# Patient Record
Sex: FEMALE | Race: BLACK OR AFRICAN AMERICAN | NOT HISPANIC OR LATINO
[De-identification: names, ages, dates, MRNs, and addresses within clinical notes are randomized per-mention and may not be internally consistent; named-entity substitution may affect disease eponyms.]

---

## 2017-01-20 ENCOUNTER — RECORD ABSTRACTING (OUTPATIENT)
Age: 27
End: 2017-01-20

## 2017-02-10 ENCOUNTER — MED ADMIN CHARGE (OUTPATIENT)
Age: 27
End: 2017-02-10

## 2017-02-10 DIAGNOSIS — Z86.19 PERSONAL HISTORY OF OTHER INFECTIOUS AND PARASITIC DISEASES: ICD-10-CM

## 2017-03-13 ENCOUNTER — TRANSCRIPTION ENCOUNTER (OUTPATIENT)
Age: 27
End: 2017-03-13

## 2017-06-10 ENCOUNTER — TRANSCRIPTION ENCOUNTER (OUTPATIENT)
Age: 27
End: 2017-06-10

## 2017-11-20 ENCOUNTER — TRANSCRIPTION ENCOUNTER (OUTPATIENT)
Age: 27
End: 2017-11-20

## 2018-05-17 ENCOUNTER — TRANSCRIPTION ENCOUNTER (OUTPATIENT)
Age: 28
End: 2018-05-17

## 2018-07-30 ENCOUNTER — TRANSCRIPTION ENCOUNTER (OUTPATIENT)
Age: 28
End: 2018-07-30

## 2020-12-15 PROBLEM — Z86.19 HISTORY OF CANDIDIASIS OF VAGINA: Status: RESOLVED | Noted: 2017-02-10 | Resolved: 2020-12-15

## 2021-03-15 ENCOUNTER — APPOINTMENT (OUTPATIENT)
Dept: PLASTIC SURGERY | Facility: CLINIC | Age: 31
End: 2021-03-15
Payer: COMMERCIAL

## 2021-03-15 VITALS — HEIGHT: 59 IN | BODY MASS INDEX: 38.3 KG/M2 | WEIGHT: 190 LBS

## 2021-03-15 DIAGNOSIS — N62 HYPERTROPHY OF BREAST: ICD-10-CM

## 2021-03-15 PROCEDURE — 99203 OFFICE O/P NEW LOW 30 MIN: CPT

## 2021-03-15 PROCEDURE — 99072 ADDL SUPL MATRL&STAF TM PHE: CPT

## 2021-03-15 RX ORDER — LEVONORGESTREL 1.5 MG/1
1.5 TABLET ORAL ONCE
Qty: 1 | Refills: 0 | Status: DISCONTINUED | COMMUNITY
Start: 2017-06-14 | End: 2021-03-15

## 2021-03-15 RX ORDER — FLUCONAZOLE 150 MG/1
150 TABLET ORAL
Qty: 1 | Refills: 0 | Status: DISCONTINUED | COMMUNITY
Start: 2017-02-10 | End: 2021-03-15

## 2021-03-16 PROBLEM — N62 MACROMASTIA: Status: ACTIVE | Noted: 2021-03-16

## 2021-03-16 NOTE — PHYSICAL EXAM
[de-identified] : well-appearing, NAD [de-identified] : EOMI [de-identified] : supple [de-identified] : nonlabored breathing [de-identified] : bilateral shoulder grooving present\par no trunk deformities [de-identified] : Bilateral superior pole deflation with poor skin tone and significant striae.  narrow breast footprint height BL\par Left breast: no palpable masses, nipple retraction or discharge.\par Right breast: no palpable masses, nipple retraction or discharge.\par Minimal bilateral axillary rolls\par Bilateral macromastia (right ~ g > left) with Grade II ptosis with chronic inflammatory hyperpigmentation skin changes\par Bilateral diminished NAC sensibility\par Anticipated volume of resection of EACH breast based on CLINICAL ASSESSMENT: 500-650 g  \par Anticipated volume of resection of EACH breast based on BSA: 500 g\par \par Breast measurements (standing, cm):\par R SN-Nipple: 34\par R Nipple-IMF: 14.5\par R Nipple - midsternum: 11.5\par R NAC diameter: 9.5\par IMF position assymmetrical, left 0.5 cm higher than right breast\par L SN-Nipple: 35\par L Nipple-IMF: 14.5\par L Nipple - midsternum: 11.5\par L NAC diameter: 9 [de-identified] : soft, protuberant, extensive periumbilical skin striae [de-identified] : FROM x 4

## 2021-03-16 NOTE — HISTORY OF PRESENT ILLNESS
[FreeTextEntry1] : Pt is a 31 y/o F with no significant PMH who presents for breast reduction consultation. C/o heavy breasts associated with neck and back pain, bilateral shoulder pain and brassiere grooving, and inframammary rashing. Also c/o difficulty finding clothing/bras that fit properly and difficulty exercising. No PT/chiropractor/XRays. Uses supportive pillows. C/o occasional nipple sensitivity and mild breast pain due to heaviness. Denies nipple inversion/discharge.\par \par Denies personal or family h/o breast disease. Has routine breast exams with gynecologist.\par \par Ht 4'11" Wt 190lb- gained 15-20lbs over the past year\par Current bra size: 38DDD, unsure what size she would like to be, ?C\par Denies h/o VTE or MRSA infections\par Nonsmoker\par Lives with 10 y/o son; no plans for pregnancy in the near future\par \par occupation: , working from home currently

## 2021-03-16 NOTE — ASSESSMENT
[FreeTextEntry1] : 29 yo F obese woman with symptomatic bilateral macromastia with Grade III ptosis.\par \par -The patient is a good candidate for bilateral reduction mammoplasty with an inferior pedicle Wise pattern technique\par -I had a detailed discussion regarding the procedure and reviewed the benefits, risks, and outcomes. \par -The risks include but are not limited to bleeding, infection, seroma, hematoma, wound separation or poor wound healing, tissue ischemia/necrosis of skin flap or NAC, scarring in particular hypertrophic or keloid scarring, breast asymmetry, need for additional surgery, loss of NAC sensation, and inability to breastfeed postpartum in the future, no improvement of neck pain, and dissatisfaction with outcome.\par -I reviewed with the patient the location of incisional scars, possible use of surgical drain, need to wear surgical support bra post-operatively, and no post-operative heavy lifting.\par -The patient understands the procedure and the risks, and she elects to proceed with reduction mammoplasty. Informed consent was obtained.\par -Will schedule her for outpatient LYNSEY procedure under [facility]\par \par -Photos were taken.\par \par Due to COVID-19, pre-visit patient instructions were explained to the patient and their symptoms were checked upon arrival. Masks were used by the healthcare provider and staff and the examination room was cleaned after the patient visit concluded\par

## 2021-07-29 ENCOUNTER — APPOINTMENT (OUTPATIENT)
Dept: PLASTIC SURGERY | Facility: AMBULATORY SURGERY CENTER | Age: 31
End: 2021-07-29

## 2021-08-05 ENCOUNTER — APPOINTMENT (OUTPATIENT)
Dept: PLASTIC SURGERY | Facility: CLINIC | Age: 31
End: 2021-08-05

## 2021-10-07 ENCOUNTER — OUTPATIENT (OUTPATIENT)
Dept: OUTPATIENT SERVICES | Facility: HOSPITAL | Age: 31
LOS: 1 days | Discharge: HOME | End: 2021-10-07
Payer: COMMERCIAL

## 2021-10-07 VITALS
RESPIRATION RATE: 18 BRPM | HEART RATE: 87 BPM | DIASTOLIC BLOOD PRESSURE: 61 MMHG | TEMPERATURE: 98 F | HEIGHT: 59 IN | SYSTOLIC BLOOD PRESSURE: 112 MMHG | WEIGHT: 197.98 LBS | OXYGEN SATURATION: 100 %

## 2021-10-07 DIAGNOSIS — N62 HYPERTROPHY OF BREAST: ICD-10-CM

## 2021-10-07 DIAGNOSIS — Z01.818 ENCOUNTER FOR OTHER PREPROCEDURAL EXAMINATION: ICD-10-CM

## 2021-10-07 LAB
ALBUMIN SERPL ELPH-MCNC: 4.6 G/DL — SIGNIFICANT CHANGE UP (ref 3.5–5.2)
ALP SERPL-CCNC: 74 U/L — SIGNIFICANT CHANGE UP (ref 30–115)
ALT FLD-CCNC: 17 U/L — SIGNIFICANT CHANGE UP (ref 0–41)
ANION GAP SERPL CALC-SCNC: 13 MMOL/L — SIGNIFICANT CHANGE UP (ref 7–14)
AST SERPL-CCNC: 15 U/L — SIGNIFICANT CHANGE UP (ref 0–41)
BASOPHILS # BLD AUTO: 0.02 K/UL — SIGNIFICANT CHANGE UP (ref 0–0.2)
BASOPHILS NFR BLD AUTO: 0.2 % — SIGNIFICANT CHANGE UP (ref 0–1)
BILIRUB SERPL-MCNC: 0.5 MG/DL — SIGNIFICANT CHANGE UP (ref 0.2–1.2)
BUN SERPL-MCNC: 8 MG/DL — LOW (ref 10–20)
CALCIUM SERPL-MCNC: 9.5 MG/DL — SIGNIFICANT CHANGE UP (ref 8.5–10.1)
CHLORIDE SERPL-SCNC: 103 MMOL/L — SIGNIFICANT CHANGE UP (ref 98–110)
CO2 SERPL-SCNC: 24 MMOL/L — SIGNIFICANT CHANGE UP (ref 17–32)
CREAT SERPL-MCNC: 0.8 MG/DL — SIGNIFICANT CHANGE UP (ref 0.7–1.5)
EOSINOPHIL # BLD AUTO: 0.03 K/UL — SIGNIFICANT CHANGE UP (ref 0–0.7)
EOSINOPHIL NFR BLD AUTO: 0.3 % — SIGNIFICANT CHANGE UP (ref 0–8)
GLUCOSE SERPL-MCNC: 84 MG/DL — SIGNIFICANT CHANGE UP (ref 70–99)
HCT VFR BLD CALC: 39.9 % — SIGNIFICANT CHANGE UP (ref 37–47)
HGB BLD-MCNC: 12.7 G/DL — SIGNIFICANT CHANGE UP (ref 12–16)
IMM GRANULOCYTES NFR BLD AUTO: 0.2 % — SIGNIFICANT CHANGE UP (ref 0.1–0.3)
LYMPHOCYTES # BLD AUTO: 2.11 K/UL — SIGNIFICANT CHANGE UP (ref 1.2–3.4)
LYMPHOCYTES # BLD AUTO: 24.4 % — SIGNIFICANT CHANGE UP (ref 20.5–51.1)
MCHC RBC-ENTMCNC: 29.4 PG — SIGNIFICANT CHANGE UP (ref 27–31)
MCHC RBC-ENTMCNC: 31.8 G/DL — LOW (ref 32–37)
MCV RBC AUTO: 92.4 FL — SIGNIFICANT CHANGE UP (ref 81–99)
MONOCYTES # BLD AUTO: 0.9 K/UL — HIGH (ref 0.1–0.6)
MONOCYTES NFR BLD AUTO: 10.4 % — HIGH (ref 1.7–9.3)
NEUTROPHILS # BLD AUTO: 5.55 K/UL — SIGNIFICANT CHANGE UP (ref 1.4–6.5)
NEUTROPHILS NFR BLD AUTO: 64.5 % — SIGNIFICANT CHANGE UP (ref 42.2–75.2)
NRBC # BLD: 0 /100 WBCS — SIGNIFICANT CHANGE UP (ref 0–0)
PLATELET # BLD AUTO: 171 K/UL — SIGNIFICANT CHANGE UP (ref 130–400)
POTASSIUM SERPL-MCNC: 4.8 MMOL/L — SIGNIFICANT CHANGE UP (ref 3.5–5)
POTASSIUM SERPL-SCNC: 4.8 MMOL/L — SIGNIFICANT CHANGE UP (ref 3.5–5)
PROT SERPL-MCNC: 8 G/DL — SIGNIFICANT CHANGE UP (ref 6–8)
RBC # BLD: 4.32 M/UL — SIGNIFICANT CHANGE UP (ref 4.2–5.4)
RBC # FLD: 13.4 % — SIGNIFICANT CHANGE UP (ref 11.5–14.5)
SODIUM SERPL-SCNC: 140 MMOL/L — SIGNIFICANT CHANGE UP (ref 135–146)
WBC # BLD: 8.63 K/UL — SIGNIFICANT CHANGE UP (ref 4.8–10.8)
WBC # FLD AUTO: 8.63 K/UL — SIGNIFICANT CHANGE UP (ref 4.8–10.8)

## 2021-10-07 PROCEDURE — 93010 ELECTROCARDIOGRAM REPORT: CPT

## 2021-10-07 NOTE — H&P PST ADULT - NSANTHOSAYNRD_GEN_A_CORE
No. LUPIS screening performed.  STOP BANG Legend: 0-2 = LOW Risk; 3-4 = INTERMEDIATE Risk; 5-8 = HIGH Risk

## 2021-10-07 NOTE — H&P PST ADULT - NSICDXFAMILYHX_GEN_ALL_CORE_FT
FAMILY HISTORY:  Mother  Still living? Unknown  FH: CAD (coronary artery disease), Age at diagnosis: Age Unknown  FH: hypertension, Age at diagnosis: Age Unknown  FH: lupus, Age at diagnosis: Age Unknown

## 2021-10-07 NOTE — H&P PST ADULT - HISTORY OF PRESENT ILLNESS
pt with pain to neck and shoulders due to breasts  now for planned procedure    PATIENT CURRENTLY DENIES CHEST PAIN  SHORTNESS OF BREATH  PALPITATIONS,  DYSURIA, OR UPPER RESPIRATORY INFECTION IN PAST 2 WEEKS  EXERCISE  TOLERANCE  1-2 FLIGHT OF STAIRS  WITHOUT SHORTNESS OF BREATH  denies travel outside the [place] in the past 30 days  1 dose pfizer 9/21/2021    Patient denies any signs or symptoms of COVID 19 and denies contact with known positive individuals.  They have an appointment for repeat COVID testing pre-procedure and acknowledge its time and place.  They were instructed to quarantine pre-procedure, practice exposure control measures, continue to self-monitor and report any concerns to their proceduralist.  pt advised self quarantine till day of procedure  Anesthesia Alert  NO--Difficult Airway  NO--History of neck surgery or radiation  NO--Limited ROM of neck  NO--History of Malignant hyperthermia  NO--No personal or family history of Pseudocholinesterase deficiency.  NO--Prior Anesthesia Complication  NO--Latex Allergy  NO--Loose teeth  NO--History of Rheumatoid Arthritis  NO--Bleeding risk  NO--LUPIS  NO--Other_____    PT DENIES ANY RASHES, ABRASION, OR OPEN WOUNDS OR CUTS    AS PER THE PT, THIS IS HIS/HER COMPLETE MEDICAL AND SURGICAL HX, INCLUDING MEDICATIONS PRESCRIBED AND OVER THE COUNTER    Patient verbalized understanding of instructions and was given the opportunity to ask questions and have them answered.    pt denies any suicidal ideation or thoughts, pt states not a threat to self or others    [id number]    Hypertrophy of breast    Encounter for other preprocedural examination    ^[id number]    FH: hypertension (Mother)    FH: lupus (Mother)    FH: CAD (coronary artery disease) (Mother)    Hypertrophy of breast    Encounter for other preprocedural examination    Heart murmur.

## 2021-10-07 NOTE — H&P PST ADULT - REASON FOR ADMISSION
Patient scheduled for Bilateral breast reduction.  Pt c/o back/ shoulder and under breast pain and discomfort.  Does not take anything for the pain.      Patient is a  31 year old  female presenting to PAST in preparation for   BILATERAL BREAST REDUCTION on 10/28/2021  under general anesthesia by Dr. FISH

## 2021-10-19 DIAGNOSIS — M79.2 NEURALGIA AND NEURITIS, UNSPECIFIED: ICD-10-CM

## 2021-10-19 DIAGNOSIS — G89.18 OTHER ACUTE POSTPROCEDURAL PAIN: ICD-10-CM

## 2021-10-26 ENCOUNTER — LABORATORY RESULT (OUTPATIENT)
Age: 31
End: 2021-10-26

## 2021-10-26 ENCOUNTER — OUTPATIENT (OUTPATIENT)
Dept: OUTPATIENT SERVICES | Facility: HOSPITAL | Age: 31
LOS: 1 days | Discharge: HOME | End: 2021-10-26

## 2021-10-26 DIAGNOSIS — Z11.59 ENCOUNTER FOR SCREENING FOR OTHER VIRAL DISEASES: ICD-10-CM

## 2021-10-28 ENCOUNTER — APPOINTMENT (OUTPATIENT)
Dept: PLASTIC SURGERY | Facility: AMBULATORY SURGERY CENTER | Age: 31
End: 2021-10-28

## 2021-11-04 ENCOUNTER — APPOINTMENT (OUTPATIENT)
Dept: PLASTIC SURGERY | Facility: CLINIC | Age: 31
End: 2021-11-04

## 2022-02-15 ENCOUNTER — APPOINTMENT (OUTPATIENT)
Dept: OBGYN | Facility: CLINIC | Age: 32
End: 2022-02-15
Payer: COMMERCIAL

## 2022-02-15 VITALS
HEART RATE: 88 BPM | WEIGHT: 200 LBS | HEIGHT: 59 IN | SYSTOLIC BLOOD PRESSURE: 124 MMHG | DIASTOLIC BLOOD PRESSURE: 74 MMHG | BODY MASS INDEX: 40.32 KG/M2 | TEMPERATURE: 98 F

## 2022-02-15 DIAGNOSIS — Z86.018 PERSONAL HISTORY OF OTHER BENIGN NEOPLASM: ICD-10-CM

## 2022-02-15 DIAGNOSIS — Z86.79 PERSONAL HISTORY OF OTHER DISEASES OF THE CIRCULATORY SYSTEM: ICD-10-CM

## 2022-02-15 DIAGNOSIS — N91.2 AMENORRHEA, UNSPECIFIED: ICD-10-CM

## 2022-02-15 DIAGNOSIS — Z82.49 FAMILY HISTORY OF ISCHEMIC HEART DISEASE AND OTHER DISEASES OF THE CIRCULATORY SYSTEM: ICD-10-CM

## 2022-02-15 LAB
BILIRUB UR QL STRIP: NORMAL
CLARITY UR: CLEAR
COLLECTION METHOD: NORMAL
GLUCOSE UR-MCNC: NORMAL
HCG UR QL: 0.2 EU/DL
HCG UR QL: POSITIVE
HGB UR QL STRIP.AUTO: NORMAL
KETONES UR-MCNC: ABNORMAL
LEUKOCYTE ESTERASE UR QL STRIP: NORMAL
NITRITE UR QL STRIP: NORMAL
PH UR STRIP: 6
PROT UR STRIP-MCNC: ABNORMAL
QUALITY CONTROL: YES
SP GR UR STRIP: 1.03

## 2022-02-15 PROCEDURE — 81003 URINALYSIS AUTO W/O SCOPE: CPT | Mod: QW

## 2022-02-15 PROCEDURE — 99204 OFFICE O/P NEW MOD 45 MIN: CPT

## 2022-02-15 PROCEDURE — 81025 URINE PREGNANCY TEST: CPT

## 2022-02-15 NOTE — HISTORY OF PRESENT ILLNESS
[Patient reported PAP Smear was normal] : Patient reported PAP Smear was normal [Y] : Positive pregnancy history [Menarche Age: ____] : age at menarche was [unfilled] [No] : Patient does not have concerns regarding sex [TextBox_4] : h/o fibroids \par no cysts , no std\par 13/28/5\par h/o genital warts in 2010  [Papeardate] :  [[facility] CorporationxFulerm] : 1 [PGHxAbortions] : 3 [[facility] Health Hospitaliving] : 1 [FreeTextEntry1] : 12-

## 2022-02-15 NOTE — PHYSICAL EXAM
[Appropriately responsive] : appropriately responsive [Alert] : alert [No Acute Distress] : no acute distress [No Lymphadenopathy] : no lymphadenopathy [Soft] : soft [Non-tender] : non-tender [Non-distended] : non-distended [No HSM] : No HSM [No Lesions] : no lesions [No Mass] : no mass [Oriented x3] : oriented x3 [Labia Majora] : normal [Labia Minora] : normal [No Bleeding] : There was no active vaginal bleeding [Normal] : normal [Normal Position] : in a normal position [Enlarged ___ wks] : enlarged [unfilled] ~Uweeks [Uterine Adnexae] : normal [Discharge] : discharge [Scant] : scant [Catie] : yellow [Thin] : thin

## 2022-02-15 NOTE — COUNSELING
[Nutrition/ Exercise/ Weight Management] : nutrition, exercise, weight management [Vitamins/Supplements] : vitamins/supplements [Breast Self Exam] : breast self exam [Pregnancy Options] : pregnancy options

## 2022-02-15 NOTE — DISCUSSION/SUMMARY
[FreeTextEntry1] : 30 yo  for amenorrhea , SIUP AT 7.1 WKS YUMIKO 10/3/22\par R/O bv\par NT TO Schedule\par prenatal labs NV\par vag cx\par pap gc chl \par SONO - SIUP AT 7 + WKS FHR POS , SMALL MYOMAS \par RTO 1 MO\par

## 2022-02-15 NOTE — PROCEDURE
[Amenorrhea] : Amenorrhea [FreeTextEntry4] : SIUP at 7+1 wks fhr pos , YS pos  small subserosal myomas noted , ovaries not visualized

## 2022-02-17 LAB
C TRACH RRNA SPEC QL NAA+PROBE: NOT DETECTED
HPV HIGH+LOW RISK DNA PNL CVX: NOT DETECTED
N GONORRHOEA RRNA SPEC QL NAA+PROBE: NOT DETECTED
SOURCE AMPLIFICATION: NORMAL
SOURCE AMPLIFICATION: NORMAL
T VAGINALIS RRNA SPEC QL NAA+PROBE: NOT DETECTED

## 2022-02-25 LAB
A VAGINAE DNA VAG QL NAA+PROBE: NORMAL
BVAB2 DNA VAG QL NAA+PROBE: NORMAL
C KRUSEI DNA VAG QL NAA+PROBE: NEGATIVE
CYTOLOGY CVX/VAG DOC THIN PREP: NORMAL
MEGA1 DNA VAG QL NAA+PROBE: NORMAL
T VAGINALIS RRNA SPEC QL NAA+PROBE: NEGATIVE

## 2022-03-11 ENCOUNTER — OUTPATIENT (OUTPATIENT)
Dept: OUTPATIENT SERVICES | Facility: HOSPITAL | Age: 32
LOS: 1 days | Discharge: HOME | End: 2022-03-11

## 2022-03-11 ENCOUNTER — ASOB RESULT (OUTPATIENT)
Age: 32
End: 2022-03-11

## 2022-03-11 ENCOUNTER — APPOINTMENT (OUTPATIENT)
Dept: ANTEPARTUM | Facility: CLINIC | Age: 32
End: 2022-03-11
Payer: COMMERCIAL

## 2022-03-11 PROCEDURE — 76801 OB US < 14 WKS SINGLE FETUS: CPT | Mod: 26

## 2022-03-16 ENCOUNTER — APPOINTMENT (OUTPATIENT)
Dept: OBGYN | Facility: CLINIC | Age: 32
End: 2022-03-16
Payer: COMMERCIAL

## 2022-03-16 VITALS
SYSTOLIC BLOOD PRESSURE: 125 MMHG | HEIGHT: 59 IN | HEART RATE: 119 BPM | BODY MASS INDEX: 41.33 KG/M2 | WEIGHT: 205 LBS | DIASTOLIC BLOOD PRESSURE: 86 MMHG | TEMPERATURE: 98 F

## 2022-03-16 DIAGNOSIS — Z3A.11 11 WEEKS GESTATION OF PREGNANCY: ICD-10-CM

## 2022-03-16 LAB
BILIRUB UR QL STRIP: NORMAL
CLARITY UR: CLEAR
COLLECTION METHOD: NORMAL
GLUCOSE UR-MCNC: NORMAL
HCG UR QL: 0.2 EU/DL
HGB UR QL STRIP.AUTO: NORMAL
KETONES UR-MCNC: NORMAL
LEUKOCYTE ESTERASE UR QL STRIP: NORMAL
NITRITE UR QL STRIP: NORMAL
PH UR STRIP: 6
PROT UR STRIP-MCNC: NORMAL
SP GR UR STRIP: 1.02

## 2022-03-16 PROCEDURE — 81003 URINALYSIS AUTO W/O SCOPE: CPT | Mod: QW

## 2022-03-16 PROCEDURE — 0500F INITIAL PRENATAL CARE VISIT: CPT

## 2022-03-21 DIAGNOSIS — O23.40 UNSPECIFIED INFECTION OF URINARY TRACT IN PREGNANCY, UNSPECIFIED TRIMESTER: ICD-10-CM

## 2022-03-21 LAB
ABO + RH PNL BLD: NORMAL
BASOPHILS # BLD AUTO: 0.03 K/UL
BASOPHILS NFR BLD AUTO: 0.2 %
BLD GP AB SCN SERPL QL: NORMAL
CMV IGG SERPL QL: <0.2 U/ML
CMV IGG SERPL-IMP: NEGATIVE
CMV IGM SERPL QL: <8 AU/ML
CMV IGM SERPL QL: NEGATIVE
EOSINOPHIL # BLD AUTO: 0.05 K/UL
EOSINOPHIL NFR BLD AUTO: 0.4 %
HBV SURFACE AB SER QL: NONREACTIVE
HBV SURFACE AG SER QL: NONREACTIVE
HCT VFR BLD CALC: 36.9 %
HCV AB SER QL: NONREACTIVE
HCV S/CO RATIO: 0.1 S/CO
HGB A MFR BLD: 97.3 %
HGB A2 MFR BLD: 2.7 %
HGB BLD-MCNC: 12 G/DL
HGB FRACT BLD-IMP: NORMAL
HIV1+2 AB SPEC QL IA.RAPID: NONREACTIVE
IMM GRANULOCYTES NFR BLD AUTO: 0.6 %
LEAD BLD-MCNC: <1 UG/DL
LYMPHOCYTES # BLD AUTO: 1.96 K/UL
LYMPHOCYTES NFR BLD AUTO: 16.3 %
MAN DIFF?: NORMAL
MCHC RBC-ENTMCNC: 29.9 PG
MCHC RBC-ENTMCNC: 32.5 G/DL
MCV RBC AUTO: 92 FL
MEV IGG FLD QL IA: 110 AU/ML
MEV IGG+IGM SER-IMP: POSITIVE
MONOCYTES # BLD AUTO: 0.86 K/UL
MONOCYTES NFR BLD AUTO: 7.1 %
NEUTROPHILS # BLD AUTO: 9.07 K/UL
NEUTROPHILS NFR BLD AUTO: 75.4 %
PLATELET # BLD AUTO: 203 K/UL
RBC # BLD: 4.01 M/UL
RBC # FLD: 13.3 %
RUBV IGG FLD-ACNC: 12.7 INDEX
RUBV IGG SER-IMP: POSITIVE
T GONDII AB SER-IMP: NEGATIVE
T GONDII AB SER-IMP: NEGATIVE
T GONDII IGG SER QL: <3 IU/ML
T GONDII IGM SER QL: <3 AU/ML
T PALLIDUM AB SER QL IA: NEGATIVE
TSH SERPL-ACNC: 0.94 UIU/ML
VZV AB TITR SER: POSITIVE
VZV IGG SER IF-ACNC: 692.5 INDEX
WBC # FLD AUTO: 12.04 K/UL

## 2022-03-23 LAB
A VAGINAE DNA VAG QL NAA+PROBE: NORMAL
AR GENE MUT ANL BLD/T: NORMAL
BVAB2 DNA VAG QL NAA+PROBE: NORMAL
C KRUSEI DNA VAG QL NAA+PROBE: NEGATIVE
FMR1 GENE MUT ANL BLD/T: NORMAL
MEGA1 DNA VAG QL NAA+PROBE: NORMAL
T VAGINALIS RRNA SPEC QL NAA+PROBE: NEGATIVE

## 2022-03-24 LAB — CFTR MUT TESTED BLD/T: NEGATIVE

## 2022-03-28 ENCOUNTER — APPOINTMENT (OUTPATIENT)
Dept: ANTEPARTUM | Facility: CLINIC | Age: 32
End: 2022-03-28
Payer: COMMERCIAL

## 2022-03-28 ENCOUNTER — OUTPATIENT (OUTPATIENT)
Dept: OUTPATIENT SERVICES | Facility: HOSPITAL | Age: 32
LOS: 1 days | Discharge: HOME | End: 2022-03-28

## 2022-03-28 ENCOUNTER — ASOB RESULT (OUTPATIENT)
Age: 32
End: 2022-03-28

## 2022-03-28 PROCEDURE — 76813 OB US NUCHAL MEAS 1 GEST: CPT | Mod: 26

## 2022-04-05 ENCOUNTER — NON-APPOINTMENT (OUTPATIENT)
Age: 32
End: 2022-04-05

## 2022-04-19 ENCOUNTER — NON-APPOINTMENT (OUTPATIENT)
Age: 32
End: 2022-04-19

## 2022-04-25 ENCOUNTER — OUTPATIENT (OUTPATIENT)
Dept: OUTPATIENT SERVICES | Facility: HOSPITAL | Age: 32
LOS: 1 days | Discharge: HOME | End: 2022-04-25

## 2022-04-25 ENCOUNTER — TRANSCRIPTION ENCOUNTER (OUTPATIENT)
Age: 32
End: 2022-04-25

## 2022-04-25 VITALS — SYSTOLIC BLOOD PRESSURE: 109 MMHG | DIASTOLIC BLOOD PRESSURE: 69 MMHG | TEMPERATURE: 97 F

## 2022-04-25 VITALS
DIASTOLIC BLOOD PRESSURE: 69 MMHG | TEMPERATURE: 97 F | HEART RATE: 113 BPM | SYSTOLIC BLOOD PRESSURE: 109 MMHG | RESPIRATION RATE: 18 BRPM

## 2022-04-25 DIAGNOSIS — Z98.890 OTHER SPECIFIED POSTPROCEDURAL STATES: Chronic | ICD-10-CM

## 2022-04-25 LAB
APPEARANCE UR: CLEAR — SIGNIFICANT CHANGE UP
BACTERIA # UR AUTO: NEGATIVE — SIGNIFICANT CHANGE UP
BILIRUB UR-MCNC: NEGATIVE — SIGNIFICANT CHANGE UP
COLOR SPEC: YELLOW — SIGNIFICANT CHANGE UP
DIFF PNL FLD: NEGATIVE — SIGNIFICANT CHANGE UP
EPI CELLS # UR: 3 /HPF — SIGNIFICANT CHANGE UP (ref 0–5)
GLUCOSE UR QL: NEGATIVE — SIGNIFICANT CHANGE UP
HYALINE CASTS # UR AUTO: 8 /LPF — HIGH (ref 0–7)
KETONES UR-MCNC: ABNORMAL
LEUKOCYTE ESTERASE UR-ACNC: NEGATIVE — SIGNIFICANT CHANGE UP
NITRITE UR-MCNC: NEGATIVE — SIGNIFICANT CHANGE UP
PH UR: 7 — SIGNIFICANT CHANGE UP (ref 5–8)
PROT UR-MCNC: ABNORMAL
RBC CASTS # UR COMP ASSIST: 0 /HPF — SIGNIFICANT CHANGE UP (ref 0–4)
SP GR SPEC: 1.03 — SIGNIFICANT CHANGE UP (ref 1.01–1.03)
UROBILINOGEN FLD QL: ABNORMAL
WBC UR QL: 6 /HPF — HIGH (ref 0–5)

## 2022-04-25 RX ORDER — ACETAMINOPHEN 500 MG
975 TABLET ORAL ONCE
Refills: 0 | Status: COMPLETED | OUTPATIENT
Start: 2022-04-25 | End: 2022-04-25

## 2022-04-25 RX ORDER — SODIUM CHLORIDE 9 MG/ML
1000 INJECTION, SOLUTION INTRAVENOUS
Refills: 0 | Status: DISCONTINUED | OUTPATIENT
Start: 2022-04-25 | End: 2022-05-09

## 2022-04-25 RX ADMIN — Medication 975 MILLIGRAM(S): at 11:49

## 2022-04-25 RX ADMIN — SODIUM CHLORIDE 125 MILLILITER(S): 9 INJECTION, SOLUTION INTRAVENOUS at 11:49

## 2022-04-25 NOTE — OB RN TRIAGE NOTE - FALL HARM RISK - UNIVERSAL INTERVENTIONS
Bed in lowest position, wheels locked, appropriate side rails in place/Call bell, personal items and telephone in reach/Instruct patient to call for assistance before getting out of bed or chair/Non-slip footwear when patient is out of bed/[place] to call system/Physically safe environment - no spills, clutter or unnecessary equipment/Purposeful Proactive Rounding/Room/bathroom lighting operational, light cord in reach

## 2022-04-25 NOTE — OB PROVIDER TRIAGE NOTE - HISTORY OF PRESENT ILLNESS
32yo  at 17w0d, YUMIKO 10/3/22 by LMP 21 c/w first trimester sonogram, presents to L&D triage with pelvic pressure. First episode occurred on Saturday, resolved spontaneously. Returned last night and constant through this morning. 6/10 intensity, unable to describe nature of pain aside for sensation of pressure. Did not try tylenol. Unchanged with ambulation or laying down. Denies cramping/contractions, leakage of fluid, vaginal bleeding. Endorses lower back pain since the start of the pregnancy, unchanged at this time. Pregnancy complicated by fibroid uterus, three visualized on sonogram at 10wk, largest 3cm. Pregnancy also complicated by UCx GBS bacteruria twice this pregnancy (3/16/22, 22), s/p nitrofurantoin treatment the first time and then ampicillin treatment 1 week ago. Pt also was COVID pos 2 weeks ago, was symptomatic with fevers, chills, URI sx and loss of taste/smell; at this time all sx resolved except for return of taste/smell. Denies HA, CP, SOB, nausea, vomiting, fevers, chills, urinary symptoms, vaginal discharge/pruritis, changes in bowel habits. Last PO: meal yesterday, water 6am. Last intercourse: this AM. Last BM: yesterday.

## 2022-04-25 NOTE — OB PROVIDER TRIAGE NOTE - NS_OBGYNHISTORY_OBGYN_ALL_OB_FT
ob hx:   FT  x1, 7-8lb, M, uncomplicated  etop x2, D&C x1    gyn hx: known fibroid uterus (see sonogram for sizes) known for at least 6 years, never caused patient problems; denies history of ovarian cysts, abnormal paps, stis

## 2022-04-25 NOTE — OB PROVIDER TRIAGE NOTE - NSHPLABSRESULTS_GEN_ALL_CORE
2/15/22  Pap smear NILM  HPV neg  Gc/ct neg    3/16/22  A pos  Ab screen neg  GBS bacteruria 10-49k ***  varicella immune  measles immune  rubella immune  RPR negative  HIV NR  HepBsAg NR    4/1/22  GBS bacteruria 10-49k    genetics:  CF neg  fragile x neg  sma neg    sonos:  10.4w: +FHR, +SIUP, dating c/w LMP, fibroid uterus x3 - anterior fundal 3.07x2.59x3.07 subserosal, posterior 1.8x1.48x2.14 intramural, posterior CECI 3.05x2.94x3.09 subserosal  13.0w: +FHR, posterior placenta, normal cord insertion, NT 15%, NB present,

## 2022-04-25 NOTE — OB PROVIDER TRIAGE NOTE - NSOBPROVIDERNOTE_OBGYN_ALL_OB_FT
30yo  at 17w0d, known fibroid uterus, GBS pos with bacteruria x2, s/p treatment, now with pelvic pressure, not in  labor, clinically and hemodynamically stable  - f/u UA, UCx  - IV fluid hydration  - Reevaluate    Dr Wilder and Dr Frey aware 30yo  at 17w0d, known fibroid uterus, GBS pos with bacteruria x2, s/p treatment, now with pelvic pressure, not in  labor, clinically and hemodynamically stable  - f/u UA, UCx  - IV fluid hydration  - tylenol  - Reevaluate    Dr Wilder and Dr Frey aware

## 2022-04-25 NOTE — OB PROVIDER TRIAGE NOTE - ADDITIONAL INSTRUCTIONS
- follow up with your obgyn at next scheduled visit on Thursday  - hydration is encouraged, at least 10 cups of water per day  - if you experience cramping/contractions, leakage of fluid, vaginal bleeding then call your doctor or come to labor and delivery

## 2022-04-25 NOTE — OB PROVIDER TRIAGE NOTE - NSHPPHYSICALEXAM_GEN_ALL_CORE
Physical exam:    Vital Signs Last 24 Hrs  T(F): 97.4 (25 Apr 2022 10:15), Max: 97.4 (25 Apr 2022 10:13)  HR: 113 (25 Apr 2022 10:15) (113 - 113)  BP: 109/69 (25 Apr 2022 10:15) (109/69 - 109/69)  RR: 18 (25 Apr 2022 10:15) (18 - 18)    Gen: AAOx3, NAD  Heart: RRR, S1 S2 WNL  Lungs: CTAB  CVA: nontender bilaterally  Abdomen: Soft, nontender, no distension, gravid, no palpable contractions  Ext: No calf tenderness, no swelling    toco: none  speculum: cervix appears long and closed, no lesions, no bleeding, no pooling  tvus: CL 3.56cm  SVE: 0/0/-3  BSS: +FHR 159bpm, gross fetal movement, posterior placenta, breech Physical exam:    Vital Signs Last 24 Hrs  T(F): 97.4 (25 Apr 2022 10:15), Max: 97.4 (25 Apr 2022 10:13)  HR: 113 (25 Apr 2022 10:15) (113 - 113)  BP: 109/69 (25 Apr 2022 10:15) (109/69 - 109/69)  RR: 18 (25 Apr 2022 10:15) (18 - 18)    Gen: AAOx3, NAD  Heart: RRR, S1 S2 WNL  Lungs: CTAB  CVA: nontender bilaterally  Abdomen: Soft, nontender, no distension, gravid, no palpable contractions  Ext: No calf tenderness, no swelling    toco: none  speculum: cervix appears long and closed, no lesions, no bleeding, no pooling  tvus: CL 3.56cm  SVE: 0/0/-3  BSS: +FHR 159bpm, gross fetal movement, posterior placenta, breech, MVP 3.5cm

## 2022-04-27 LAB
CULTURE RESULTS: SIGNIFICANT CHANGE UP
SPECIMEN SOURCE: SIGNIFICANT CHANGE UP

## 2022-04-28 ENCOUNTER — APPOINTMENT (OUTPATIENT)
Dept: OBGYN | Facility: CLINIC | Age: 32
End: 2022-04-28
Payer: COMMERCIAL

## 2022-04-28 VITALS
HEIGHT: 59 IN | DIASTOLIC BLOOD PRESSURE: 82 MMHG | HEART RATE: 97 BPM | TEMPERATURE: 97 F | BODY MASS INDEX: 41.43 KG/M2 | SYSTOLIC BLOOD PRESSURE: 110 MMHG | WEIGHT: 205.5 LBS

## 2022-04-28 DIAGNOSIS — Z3A.17 17 WEEKS GESTATION OF PREGNANCY: ICD-10-CM

## 2022-04-28 PROBLEM — R01.1 CARDIAC MURMUR, UNSPECIFIED: Chronic | Status: ACTIVE | Noted: 2021-10-07

## 2022-04-28 PROCEDURE — 0502F SUBSEQUENT PRENATAL CARE: CPT

## 2022-04-28 PROCEDURE — 81003 URINALYSIS AUTO W/O SCOPE: CPT | Mod: QW

## 2022-04-29 ENCOUNTER — NON-APPOINTMENT (OUTPATIENT)
Age: 32
End: 2022-04-29

## 2022-04-29 DIAGNOSIS — O99.012 ANEMIA COMPLICATING PREGNANCY, SECOND TRIMESTER: ICD-10-CM

## 2022-04-29 LAB
BASOPHILS # BLD AUTO: 0.02 K/UL
BASOPHILS NFR BLD AUTO: 0.2 %
BILIRUB UR QL STRIP: NORMAL
CLARITY UR: CLEAR
COLLECTION METHOD: NORMAL
EOSINOPHIL # BLD AUTO: 0.05 K/UL
EOSINOPHIL NFR BLD AUTO: 0.4 %
GLUCOSE UR-MCNC: NORMAL
HCG UR QL: 0.2 EU/DL
HCT VFR BLD CALC: 34.8 %
HGB BLD-MCNC: 10.9 G/DL
HGB UR QL STRIP.AUTO: NORMAL
IMM GRANULOCYTES NFR BLD AUTO: 0.6 %
KETONES UR-MCNC: NORMAL
LEUKOCYTE ESTERASE UR QL STRIP: ABNORMAL
LYMPHOCYTES # BLD AUTO: 1.94 K/UL
LYMPHOCYTES NFR BLD AUTO: 16.2 %
MAN DIFF?: NORMAL
MCHC RBC-ENTMCNC: 29.4 PG
MCHC RBC-ENTMCNC: 31.3 G/DL
MCV RBC AUTO: 93.8 FL
MONOCYTES # BLD AUTO: 0.93 K/UL
MONOCYTES NFR BLD AUTO: 7.8 %
NEUTROPHILS # BLD AUTO: 8.99 K/UL
NEUTROPHILS NFR BLD AUTO: 74.8 %
NITRITE UR QL STRIP: NORMAL
PH UR STRIP: 7
PLATELET # BLD AUTO: 185 K/UL
PROT UR STRIP-MCNC: ABNORMAL
RBC # BLD: 3.71 M/UL
RBC # FLD: 13.3 %
SP GR UR STRIP: 1.03
WBC # FLD AUTO: 12 K/UL

## 2022-05-02 LAB
2ND TRIMESTER DATA: NORMAL
AFP PNL SERPL: NORMAL
AFP SERPL-ACNC: NORMAL
BACTERIA UR CULT: NORMAL
CLINICAL BIOCHEMIST REVIEW: NORMAL
NOTES NTD: NORMAL

## 2022-05-16 NOTE — H&P PST ADULT - NSICDXPASTMEDICALHX_GEN_ALL_CORE_FT
Test Date:    2022-05-15               Test Time:    13:04:09

Technician:   GRACE                                     

                                                     

MEASUREMENT RESULTS:                                       

Intervals:                                           

Rate:         73                                     

NM:           124                                    

QRSD:         84                                     

QT:           368                                    

QTc:          405                                    

Axis:                                                

P:            31                                     

NM:           124                                    

QRS:          65                                     

T:            41                                     

                                                     

INTERPRETIVE STATEMENTS:                                       

                                                     

** * Pediatric ECG analysis * **

Normal sinus rhythm

Normal ECG

Compared to ECG 05/02/2022 20:56:08

No significant changes



Electronically Signed On 05-16-22 10:02:42 CDT by Cole Ball PAST MEDICAL HISTORY:  Heart murmur

## 2022-05-20 ENCOUNTER — APPOINTMENT (OUTPATIENT)
Dept: ANTEPARTUM | Facility: CLINIC | Age: 32
End: 2022-05-20
Payer: COMMERCIAL

## 2022-05-20 ENCOUNTER — ASOB RESULT (OUTPATIENT)
Age: 32
End: 2022-05-20

## 2022-05-20 ENCOUNTER — OUTPATIENT (OUTPATIENT)
Dept: OUTPATIENT SERVICES | Facility: HOSPITAL | Age: 32
LOS: 1 days | Discharge: HOME | End: 2022-05-20

## 2022-05-20 DIAGNOSIS — Z98.890 OTHER SPECIFIED POSTPROCEDURAL STATES: Chronic | ICD-10-CM

## 2022-05-20 PROCEDURE — 76811 OB US DETAILED SNGL FETUS: CPT | Mod: 26

## 2022-05-20 PROCEDURE — 76817 TRANSVAGINAL US OBSTETRIC: CPT | Mod: 26

## 2022-05-31 ENCOUNTER — APPOINTMENT (OUTPATIENT)
Dept: OBGYN | Facility: CLINIC | Age: 32
End: 2022-05-31

## 2022-05-31 VITALS
DIASTOLIC BLOOD PRESSURE: 69 MMHG | HEIGHT: 59 IN | TEMPERATURE: 97.8 F | SYSTOLIC BLOOD PRESSURE: 100 MMHG | BODY MASS INDEX: 42.74 KG/M2 | HEART RATE: 95 BPM | WEIGHT: 212 LBS

## 2022-05-31 LAB
BILIRUB UR QL STRIP: NORMAL
CLARITY UR: CLEAR
COLLECTION METHOD: NORMAL
GLUCOSE UR-MCNC: NORMAL
HCG UR QL: 1 EU/DL
HGB UR QL STRIP.AUTO: NORMAL
KETONES UR-MCNC: NORMAL
LEUKOCYTE ESTERASE UR QL STRIP: ABNORMAL
NITRITE UR QL STRIP: NORMAL
PH UR STRIP: 6.5
PROT UR STRIP-MCNC: ABNORMAL
SP GR UR STRIP: 1.03

## 2022-05-31 PROCEDURE — 0502F SUBSEQUENT PRENATAL CARE: CPT

## 2022-05-31 PROCEDURE — 81003 URINALYSIS AUTO W/O SCOPE: CPT | Mod: QW

## 2022-06-03 LAB — BACTERIA UR CULT: NORMAL

## 2022-06-30 ENCOUNTER — APPOINTMENT (OUTPATIENT)
Dept: OBGYN | Facility: CLINIC | Age: 32
End: 2022-06-30

## 2022-06-30 VITALS
SYSTOLIC BLOOD PRESSURE: 110 MMHG | WEIGHT: 216 LBS | BODY MASS INDEX: 43.55 KG/M2 | HEIGHT: 59 IN | TEMPERATURE: 97.9 F | DIASTOLIC BLOOD PRESSURE: 72 MMHG | HEART RATE: 96 BPM

## 2022-06-30 DIAGNOSIS — R12 OTHER SPECIFIED PREGNANCY RELATED CONDITIONS, UNSPECIFIED TRIMESTER: ICD-10-CM

## 2022-06-30 DIAGNOSIS — Z3A.26 26 WEEKS GESTATION OF PREGNANCY: ICD-10-CM

## 2022-06-30 DIAGNOSIS — R12 HEARTBURN: ICD-10-CM

## 2022-06-30 DIAGNOSIS — O26.899 OTHER SPECIFIED PREGNANCY RELATED CONDITIONS, UNSPECIFIED TRIMESTER: ICD-10-CM

## 2022-06-30 LAB
BILIRUB UR QL STRIP: NORMAL
CLARITY UR: CLEAR
COLLECTION METHOD: NORMAL
GLUCOSE UR-MCNC: NORMAL
HCG UR QL: 0.2 EU/DL
HGB UR QL STRIP.AUTO: NORMAL
KETONES UR-MCNC: NORMAL
LEUKOCYTE ESTERASE UR QL STRIP: ABNORMAL
NITRITE UR QL STRIP: NORMAL
PH UR STRIP: 7
PROT UR STRIP-MCNC: ABNORMAL
SP GR UR STRIP: 1.02

## 2022-06-30 PROCEDURE — 0502F SUBSEQUENT PRENATAL CARE: CPT

## 2022-06-30 PROCEDURE — 81003 URINALYSIS AUTO W/O SCOPE: CPT | Mod: QW

## 2022-07-01 LAB
BASOPHILS # BLD AUTO: 0.03 K/UL
BASOPHILS NFR BLD AUTO: 0.3 %
EOSINOPHIL # BLD AUTO: 0.07 K/UL
EOSINOPHIL NFR BLD AUTO: 0.6 %
GLUCOSE 1H P 50 G GLC PO SERPL-MCNC: 104 MG/DL
HCT VFR BLD CALC: 36.4 %
HGB BLD-MCNC: 10.8 G/DL
IMM GRANULOCYTES NFR BLD AUTO: 0.5 %
LYMPHOCYTES # BLD AUTO: 1.74 K/UL
LYMPHOCYTES NFR BLD AUTO: 15.7 %
MAN DIFF?: NORMAL
MCHC RBC-ENTMCNC: 28.6 PG
MCHC RBC-ENTMCNC: 29.7 G/DL
MCV RBC AUTO: 96.6 FL
MONOCYTES # BLD AUTO: 0.65 K/UL
MONOCYTES NFR BLD AUTO: 5.9 %
NEUTROPHILS # BLD AUTO: 8.56 K/UL
NEUTROPHILS NFR BLD AUTO: 77 %
PLATELET # BLD AUTO: 169 K/UL
RBC # BLD: 3.77 M/UL
RBC # FLD: 14.4 %
WBC # FLD AUTO: 11.1 K/UL

## 2022-08-02 ENCOUNTER — APPOINTMENT (OUTPATIENT)
Dept: OBGYN | Facility: CLINIC | Age: 32
End: 2022-08-02

## 2022-08-02 VITALS
HEART RATE: 106 BPM | BODY MASS INDEX: 44.55 KG/M2 | HEIGHT: 59 IN | DIASTOLIC BLOOD PRESSURE: 81 MMHG | WEIGHT: 221 LBS | SYSTOLIC BLOOD PRESSURE: 126 MMHG

## 2022-08-02 LAB
BILIRUB UR QL STRIP: NORMAL
CLARITY UR: CLEAR
COLLECTION METHOD: NORMAL
GLUCOSE UR-MCNC: NORMAL
HCG UR QL: 0.2 EU/DL
HGB UR QL STRIP.AUTO: NORMAL
KETONES UR-MCNC: >160
LEUKOCYTE ESTERASE UR QL STRIP: NORMAL
NITRITE UR QL STRIP: NORMAL
PH UR STRIP: 6
PROT UR STRIP-MCNC: ABNORMAL
SP GR UR STRIP: >=1.03

## 2022-08-02 PROCEDURE — 0502F SUBSEQUENT PRENATAL CARE: CPT

## 2022-08-02 PROCEDURE — 81003 URINALYSIS AUTO W/O SCOPE: CPT | Mod: QW

## 2022-08-03 ENCOUNTER — NON-APPOINTMENT (OUTPATIENT)
Age: 32
End: 2022-08-03

## 2022-08-12 ENCOUNTER — OUTPATIENT (OUTPATIENT)
Dept: OUTPATIENT SERVICES | Facility: HOSPITAL | Age: 32
LOS: 1 days | Discharge: HOME | End: 2022-08-12

## 2022-08-12 ENCOUNTER — APPOINTMENT (OUTPATIENT)
Dept: ANTEPARTUM | Facility: CLINIC | Age: 32
End: 2022-08-12

## 2022-08-12 ENCOUNTER — ASOB RESULT (OUTPATIENT)
Age: 32
End: 2022-08-12

## 2022-08-12 DIAGNOSIS — Z98.890 OTHER SPECIFIED POSTPROCEDURAL STATES: Chronic | ICD-10-CM

## 2022-08-12 PROCEDURE — 76816 OB US FOLLOW-UP PER FETUS: CPT | Mod: 26

## 2022-08-24 ENCOUNTER — APPOINTMENT (OUTPATIENT)
Dept: OBGYN | Facility: CLINIC | Age: 32
End: 2022-08-24

## 2022-08-24 VITALS
TEMPERATURE: 97.8 F | HEART RATE: 92 BPM | SYSTOLIC BLOOD PRESSURE: 109 MMHG | DIASTOLIC BLOOD PRESSURE: 78 MMHG | BODY MASS INDEX: 43.95 KG/M2 | HEIGHT: 59 IN | WEIGHT: 218 LBS

## 2022-08-24 LAB
BILIRUB UR QL STRIP: NORMAL
CLARITY UR: CLEAR
COLLECTION METHOD: NORMAL
GLUCOSE UR-MCNC: NORMAL
HCG UR QL: 0.2 EU/DL
HGB UR QL STRIP.AUTO: NORMAL
KETONES UR-MCNC: NORMAL
LEUKOCYTE ESTERASE UR QL STRIP: ABNORMAL
NITRITE UR QL STRIP: NORMAL
PH UR STRIP: 7
PROT UR STRIP-MCNC: NORMAL
SP GR UR STRIP: 1.02

## 2022-08-24 PROCEDURE — 81003 URINALYSIS AUTO W/O SCOPE: CPT | Mod: QW

## 2022-08-24 PROCEDURE — 0502F SUBSEQUENT PRENATAL CARE: CPT

## 2022-08-26 ENCOUNTER — OUTPATIENT (OUTPATIENT)
Dept: OUTPATIENT SERVICES | Facility: HOSPITAL | Age: 32
LOS: 1 days | Discharge: HOME | End: 2022-08-26

## 2022-08-26 ENCOUNTER — APPOINTMENT (OUTPATIENT)
Dept: ANTEPARTUM | Facility: CLINIC | Age: 32
End: 2022-08-26

## 2022-08-26 ENCOUNTER — ASOB RESULT (OUTPATIENT)
Age: 32
End: 2022-08-26

## 2022-08-26 DIAGNOSIS — Z98.890 OTHER SPECIFIED POSTPROCEDURAL STATES: Chronic | ICD-10-CM

## 2022-08-26 PROCEDURE — 76819 FETAL BIOPHYS PROFIL W/O NST: CPT | Mod: 26

## 2022-09-02 ENCOUNTER — APPOINTMENT (OUTPATIENT)
Dept: ANTEPARTUM | Facility: CLINIC | Age: 32
End: 2022-09-02

## 2022-09-02 ENCOUNTER — ASOB RESULT (OUTPATIENT)
Age: 32
End: 2022-09-02

## 2022-09-02 ENCOUNTER — OUTPATIENT (OUTPATIENT)
Dept: OUTPATIENT SERVICES | Facility: HOSPITAL | Age: 32
LOS: 1 days | Discharge: HOME | End: 2022-09-02

## 2022-09-02 DIAGNOSIS — Z98.890 OTHER SPECIFIED POSTPROCEDURAL STATES: Chronic | ICD-10-CM

## 2022-09-02 PROCEDURE — 76819 FETAL BIOPHYS PROFIL W/O NST: CPT | Mod: 26

## 2022-09-07 ENCOUNTER — APPOINTMENT (OUTPATIENT)
Dept: OBGYN | Facility: CLINIC | Age: 32
End: 2022-09-07

## 2022-09-07 VITALS
SYSTOLIC BLOOD PRESSURE: 109 MMHG | WEIGHT: 222 LBS | TEMPERATURE: 98 F | HEIGHT: 59 IN | HEART RATE: 89 BPM | DIASTOLIC BLOOD PRESSURE: 77 MMHG | BODY MASS INDEX: 44.76 KG/M2

## 2022-09-07 DIAGNOSIS — Z3A.36 36 WEEKS GESTATION OF PREGNANCY: ICD-10-CM

## 2022-09-07 LAB
BILIRUB UR QL STRIP: NORMAL
CLARITY UR: NORMAL
COLLECTION METHOD: NORMAL
GLUCOSE UR-MCNC: NORMAL
HCG UR QL: 1 EU/DL
HGB UR QL STRIP.AUTO: NORMAL
KETONES UR-MCNC: ABNORMAL
LEUKOCYTE ESTERASE UR QL STRIP: NORMAL
NITRITE UR QL STRIP: NORMAL
PH UR STRIP: 6
PROT UR STRIP-MCNC: ABNORMAL
SP GR UR STRIP: 1.03

## 2022-09-07 PROCEDURE — 81003 URINALYSIS AUTO W/O SCOPE: CPT | Mod: QW

## 2022-09-07 PROCEDURE — 0502F SUBSEQUENT PRENATAL CARE: CPT

## 2022-09-08 LAB
BASOPHILS # BLD AUTO: 0.03 K/UL
BASOPHILS NFR BLD AUTO: 0.3 %
EOSINOPHIL # BLD AUTO: 0.03 K/UL
EOSINOPHIL NFR BLD AUTO: 0.3 %
HCT VFR BLD CALC: 39.4 %
HGB BLD-MCNC: 11.7 G/DL
HIV1+2 AB SPEC QL IA.RAPID: NONREACTIVE
IMM GRANULOCYTES NFR BLD AUTO: 0.5 %
LYMPHOCYTES # BLD AUTO: 1.67 K/UL
LYMPHOCYTES NFR BLD AUTO: 17.4 %
MAN DIFF?: NORMAL
MCHC RBC-ENTMCNC: 27.8 PG
MCHC RBC-ENTMCNC: 29.7 G/DL
MCV RBC AUTO: 93.6 FL
MONOCYTES # BLD AUTO: 0.88 K/UL
MONOCYTES NFR BLD AUTO: 9.2 %
NEUTROPHILS # BLD AUTO: 6.95 K/UL
NEUTROPHILS NFR BLD AUTO: 72.3 %
PLATELET # BLD AUTO: 174 K/UL
RBC # BLD: 4.21 M/UL
RBC # FLD: 15.4 %
T PALLIDUM AB SER QL IA: NEGATIVE
WBC # FLD AUTO: 9.61 K/UL

## 2022-09-09 ENCOUNTER — APPOINTMENT (OUTPATIENT)
Dept: ANTEPARTUM | Facility: CLINIC | Age: 32
End: 2022-09-09

## 2022-09-12 ENCOUNTER — NON-APPOINTMENT (OUTPATIENT)
Age: 32
End: 2022-09-12

## 2022-09-15 ENCOUNTER — APPOINTMENT (OUTPATIENT)
Dept: OBGYN | Facility: CLINIC | Age: 32
End: 2022-09-15

## 2022-09-15 VITALS
WEIGHT: 223 LBS | HEIGHT: 59 IN | DIASTOLIC BLOOD PRESSURE: 73 MMHG | HEART RATE: 89 BPM | TEMPERATURE: 97.8 F | BODY MASS INDEX: 44.96 KG/M2 | SYSTOLIC BLOOD PRESSURE: 102 MMHG

## 2022-09-15 LAB
BILIRUB UR QL STRIP: NORMAL
CLARITY UR: CLEAR
COLLECTION METHOD: NORMAL
GLUCOSE UR-MCNC: NORMAL
HCG UR QL: 0.2 EU/DL
HGB UR QL STRIP.AUTO: NORMAL
KETONES UR-MCNC: ABNORMAL
LEUKOCYTE ESTERASE UR QL STRIP: NORMAL
NITRITE UR QL STRIP: NORMAL
PH UR STRIP: 6
PROT UR STRIP-MCNC: NORMAL
SP GR UR STRIP: 1.02

## 2022-09-15 PROCEDURE — 81003 URINALYSIS AUTO W/O SCOPE: CPT | Mod: QW

## 2022-09-15 PROCEDURE — 0502F SUBSEQUENT PRENATAL CARE: CPT

## 2022-09-16 ENCOUNTER — OUTPATIENT (OUTPATIENT)
Dept: OUTPATIENT SERVICES | Facility: HOSPITAL | Age: 32
LOS: 1 days | Discharge: HOME | End: 2022-09-16

## 2022-09-16 ENCOUNTER — APPOINTMENT (OUTPATIENT)
Dept: ANTEPARTUM | Facility: CLINIC | Age: 32
End: 2022-09-16

## 2022-09-16 ENCOUNTER — ASOB RESULT (OUTPATIENT)
Age: 32
End: 2022-09-16

## 2022-09-16 DIAGNOSIS — Z98.890 OTHER SPECIFIED POSTPROCEDURAL STATES: Chronic | ICD-10-CM

## 2022-09-16 PROCEDURE — 76819 FETAL BIOPHYS PROFIL W/O NST: CPT | Mod: 26,59

## 2022-09-16 PROCEDURE — 76816 OB US FOLLOW-UP PER FETUS: CPT | Mod: 26

## 2022-09-22 ENCOUNTER — APPOINTMENT (OUTPATIENT)
Dept: OBGYN | Facility: CLINIC | Age: 32
End: 2022-09-22

## 2022-09-22 VITALS
DIASTOLIC BLOOD PRESSURE: 78 MMHG | HEIGHT: 59 IN | HEART RATE: 102 BPM | SYSTOLIC BLOOD PRESSURE: 111 MMHG | WEIGHT: 230 LBS | TEMPERATURE: 97.7 F | BODY MASS INDEX: 46.37 KG/M2

## 2022-09-22 LAB
BILIRUB UR QL STRIP: NORMAL
CLARITY UR: CLEAR
COLLECTION METHOD: NORMAL
GLUCOSE UR-MCNC: NORMAL
HCG UR QL: 1 EU/DL
HGB UR QL STRIP.AUTO: NORMAL
KETONES UR-MCNC: ABNORMAL
LEUKOCYTE ESTERASE UR QL STRIP: ABNORMAL
NITRITE UR QL STRIP: NORMAL
PH UR STRIP: 7
PROT UR STRIP-MCNC: ABNORMAL
SP GR UR STRIP: 1.02

## 2022-09-22 PROCEDURE — 0502F SUBSEQUENT PRENATAL CARE: CPT

## 2022-09-22 PROCEDURE — 81003 URINALYSIS AUTO W/O SCOPE: CPT | Mod: QW

## 2022-09-23 ENCOUNTER — APPOINTMENT (OUTPATIENT)
Dept: ANTEPARTUM | Facility: CLINIC | Age: 32
End: 2022-09-23

## 2022-09-23 ENCOUNTER — OUTPATIENT (OUTPATIENT)
Dept: OUTPATIENT SERVICES | Facility: HOSPITAL | Age: 32
LOS: 1 days | Discharge: HOME | End: 2022-09-23

## 2022-09-23 ENCOUNTER — ASOB RESULT (OUTPATIENT)
Age: 32
End: 2022-09-23

## 2022-09-23 DIAGNOSIS — Z98.890 OTHER SPECIFIED POSTPROCEDURAL STATES: Chronic | ICD-10-CM

## 2022-09-23 PROCEDURE — 76819 FETAL BIOPHYS PROFIL W/O NST: CPT | Mod: 26

## 2022-09-27 ENCOUNTER — NON-APPOINTMENT (OUTPATIENT)
Age: 32
End: 2022-09-27

## 2022-09-28 ENCOUNTER — APPOINTMENT (OUTPATIENT)
Dept: OBGYN | Facility: CLINIC | Age: 32
End: 2022-09-28

## 2022-09-28 VITALS
TEMPERATURE: 97.8 F | DIASTOLIC BLOOD PRESSURE: 84 MMHG | SYSTOLIC BLOOD PRESSURE: 103 MMHG | HEART RATE: 106 BPM | WEIGHT: 226 LBS | BODY MASS INDEX: 45.56 KG/M2 | HEIGHT: 59 IN

## 2022-09-28 LAB
BILIRUB UR QL STRIP: NORMAL
CLARITY UR: NORMAL
COLLECTION METHOD: NORMAL
GLUCOSE UR-MCNC: NORMAL
HCG UR QL: 0.2 EU/DL
HGB UR QL STRIP.AUTO: NORMAL
KETONES UR-MCNC: ABNORMAL
LEUKOCYTE ESTERASE UR QL STRIP: ABNORMAL
NITRITE UR QL STRIP: NORMAL
PH UR STRIP: 6
PROT UR STRIP-MCNC: ABNORMAL
SP GR UR STRIP: 1.03

## 2022-09-28 PROCEDURE — 81003 URINALYSIS AUTO W/O SCOPE: CPT | Mod: QW

## 2022-09-28 PROCEDURE — 0502F SUBSEQUENT PRENATAL CARE: CPT

## 2022-09-29 ENCOUNTER — INPATIENT (INPATIENT)
Facility: HOSPITAL | Age: 32
LOS: 0 days | Discharge: HOME | End: 2022-09-30

## 2022-09-29 VITALS
RESPIRATION RATE: 14 BRPM | DIASTOLIC BLOOD PRESSURE: 104 MMHG | TEMPERATURE: 98 F | SYSTOLIC BLOOD PRESSURE: 168 MMHG | HEART RATE: 100 BPM

## 2022-09-29 DIAGNOSIS — Z98.890 OTHER SPECIFIED POSTPROCEDURAL STATES: Chronic | ICD-10-CM

## 2022-09-29 DIAGNOSIS — O34.73 MATERNAL CARE FOR ABNORMALITY OF VULVA AND PERINEUM, THIRD TRIMESTER: ICD-10-CM

## 2022-09-29 DIAGNOSIS — Z34.80 ENCOUNTER FOR SUPERVISION OF OTHER NORMAL PREGNANCY, UNSPECIFIED TRIMESTER: ICD-10-CM

## 2022-09-29 DIAGNOSIS — Z3A.39 39 WEEKS GESTATION OF PREGNANCY: ICD-10-CM

## 2022-09-29 DIAGNOSIS — R03.0 ELEVATED BLOOD-PRESSURE READING, WITHOUT DIAGNOSIS OF HYPERTENSION: ICD-10-CM

## 2022-09-29 LAB
ABO RH CONFIRMATION: SIGNIFICANT CHANGE UP
ALBUMIN SERPL ELPH-MCNC: 3.7 G/DL — SIGNIFICANT CHANGE UP (ref 3.5–5.2)
ALP SERPL-CCNC: 155 U/L — HIGH (ref 30–115)
ALT FLD-CCNC: 10 U/L — SIGNIFICANT CHANGE UP (ref 0–41)
ANION GAP SERPL CALC-SCNC: 11 MMOL/L — SIGNIFICANT CHANGE UP (ref 7–14)
APTT BLD: 27.9 SEC — SIGNIFICANT CHANGE UP (ref 27–39.2)
AST SERPL-CCNC: 16 U/L — SIGNIFICANT CHANGE UP (ref 0–41)
BASOPHILS # BLD AUTO: 0.03 K/UL — SIGNIFICANT CHANGE UP (ref 0–0.2)
BASOPHILS # BLD AUTO: 0.03 K/UL — SIGNIFICANT CHANGE UP (ref 0–0.2)
BASOPHILS NFR BLD AUTO: 0.2 % — SIGNIFICANT CHANGE UP (ref 0–1)
BASOPHILS NFR BLD AUTO: 0.2 % — SIGNIFICANT CHANGE UP (ref 0–1)
BILIRUB SERPL-MCNC: 0.3 MG/DL — SIGNIFICANT CHANGE UP (ref 0.2–1.2)
BLD GP AB SCN SERPL QL: SIGNIFICANT CHANGE UP
BUN SERPL-MCNC: 5 MG/DL — LOW (ref 10–20)
CALCIUM SERPL-MCNC: 8.9 MG/DL — SIGNIFICANT CHANGE UP (ref 8.4–10.5)
CHLORIDE SERPL-SCNC: 99 MMOL/L — SIGNIFICANT CHANGE UP (ref 98–110)
CO2 SERPL-SCNC: 21 MMOL/L — SIGNIFICANT CHANGE UP (ref 17–32)
CREAT SERPL-MCNC: 0.6 MG/DL — LOW (ref 0.7–1.5)
EGFR: 122 ML/MIN/1.73M2 — SIGNIFICANT CHANGE UP
EOSINOPHIL # BLD AUTO: 0.02 K/UL — SIGNIFICANT CHANGE UP (ref 0–0.7)
EOSINOPHIL # BLD AUTO: 0.04 K/UL — SIGNIFICANT CHANGE UP (ref 0–0.7)
EOSINOPHIL NFR BLD AUTO: 0.1 % — SIGNIFICANT CHANGE UP (ref 0–8)
EOSINOPHIL NFR BLD AUTO: 0.3 % — SIGNIFICANT CHANGE UP (ref 0–8)
FIBRINOGEN PPP-MCNC: >700 MG/DL — HIGH (ref 204.4–570.6)
GLUCOSE SERPL-MCNC: 93 MG/DL — SIGNIFICANT CHANGE UP (ref 70–99)
HCT VFR BLD CALC: 32.2 % — LOW (ref 37–47)
HCT VFR BLD CALC: 38.4 % — SIGNIFICANT CHANGE UP (ref 37–47)
HGB BLD-MCNC: 10.5 G/DL — LOW (ref 12–16)
HGB BLD-MCNC: 12.5 G/DL — SIGNIFICANT CHANGE UP (ref 12–16)
IMM GRANULOCYTES NFR BLD AUTO: 0.3 % — SIGNIFICANT CHANGE UP (ref 0.1–0.3)
IMM GRANULOCYTES NFR BLD AUTO: 0.5 % — HIGH (ref 0.1–0.3)
INR BLD: 0.92 RATIO — SIGNIFICANT CHANGE UP (ref 0.65–1.3)
LDH SERPL L TO P-CCNC: 319 — HIGH (ref 50–242)
LYMPHOCYTES # BLD AUTO: 1.86 K/UL — SIGNIFICANT CHANGE UP (ref 1.2–3.4)
LYMPHOCYTES # BLD AUTO: 13.3 % — LOW (ref 20.5–51.1)
LYMPHOCYTES # BLD AUTO: 14 % — LOW (ref 20.5–51.1)
LYMPHOCYTES # BLD AUTO: 2.18 K/UL — SIGNIFICANT CHANGE UP (ref 1.2–3.4)
MCHC RBC-ENTMCNC: 28.7 PG — SIGNIFICANT CHANGE UP (ref 27–31)
MCHC RBC-ENTMCNC: 28.7 PG — SIGNIFICANT CHANGE UP (ref 27–31)
MCHC RBC-ENTMCNC: 32.6 G/DL — SIGNIFICANT CHANGE UP (ref 32–37)
MCHC RBC-ENTMCNC: 32.6 G/DL — SIGNIFICANT CHANGE UP (ref 32–37)
MCV RBC AUTO: 88 FL — SIGNIFICANT CHANGE UP (ref 81–99)
MCV RBC AUTO: 88.3 FL — SIGNIFICANT CHANGE UP (ref 81–99)
MONOCYTES # BLD AUTO: 1.04 K/UL — HIGH (ref 0.1–0.6)
MONOCYTES # BLD AUTO: 1.21 K/UL — HIGH (ref 0.1–0.6)
MONOCYTES NFR BLD AUTO: 7.4 % — SIGNIFICANT CHANGE UP (ref 1.7–9.3)
MONOCYTES NFR BLD AUTO: 7.8 % — SIGNIFICANT CHANGE UP (ref 1.7–9.3)
NEUTROPHILS # BLD AUTO: 10.98 K/UL — HIGH (ref 1.4–6.5)
NEUTROPHILS # BLD AUTO: 12.03 K/UL — HIGH (ref 1.4–6.5)
NEUTROPHILS NFR BLD AUTO: 77.2 % — HIGH (ref 42.2–75.2)
NEUTROPHILS NFR BLD AUTO: 78.7 % — HIGH (ref 42.2–75.2)
NRBC # BLD: 0 /100 WBCS — SIGNIFICANT CHANGE UP (ref 0–0)
NRBC # BLD: 0 /100 WBCS — SIGNIFICANT CHANGE UP (ref 0–0)
PLATELET # BLD AUTO: 139 K/UL — SIGNIFICANT CHANGE UP (ref 130–400)
PLATELET # BLD AUTO: 166 K/UL — SIGNIFICANT CHANGE UP (ref 130–400)
POTASSIUM SERPL-MCNC: 4.1 MMOL/L — SIGNIFICANT CHANGE UP (ref 3.5–5)
POTASSIUM SERPL-SCNC: 4.1 MMOL/L — SIGNIFICANT CHANGE UP (ref 3.5–5)
PRENATAL SYPHILIS TEST: SIGNIFICANT CHANGE UP
PROT SERPL-MCNC: 7 G/DL — SIGNIFICANT CHANGE UP (ref 6–8)
PROTHROM AB SERPL-ACNC: 10.6 SEC — SIGNIFICANT CHANGE UP (ref 9.95–12.87)
RBC # BLD: 3.66 M/UL — LOW (ref 4.2–5.4)
RBC # BLD: 4.35 M/UL — SIGNIFICANT CHANGE UP (ref 4.2–5.4)
RBC # FLD: 15.1 % — HIGH (ref 11.5–14.5)
RBC # FLD: 15.3 % — HIGH (ref 11.5–14.5)
SARS-COV-2 RNA SPEC QL NAA+PROBE: SIGNIFICANT CHANGE UP
SODIUM SERPL-SCNC: 131 MMOL/L — LOW (ref 135–146)
URATE SERPL-MCNC: 4.6 MG/DL — SIGNIFICANT CHANGE UP (ref 2.5–7)
WBC # BLD: 13.97 K/UL — HIGH (ref 4.8–10.8)
WBC # BLD: 15.57 K/UL — HIGH (ref 4.8–10.8)
WBC # FLD AUTO: 13.97 K/UL — HIGH (ref 4.8–10.8)
WBC # FLD AUTO: 15.57 K/UL — HIGH (ref 4.8–10.8)

## 2022-09-29 PROCEDURE — 59400 OBSTETRICAL CARE: CPT | Mod: U9

## 2022-09-29 RX ORDER — INFLUENZA VIRUS VACCINE 15; 15; 15; 15 UG/.5ML; UG/.5ML; UG/.5ML; UG/.5ML
0.5 SUSPENSION INTRAMUSCULAR ONCE
Refills: 0 | Status: DISCONTINUED | OUTPATIENT
Start: 2022-09-29 | End: 2022-09-30

## 2022-09-29 RX ORDER — SIMETHICONE 80 MG/1
80 TABLET, CHEWABLE ORAL EVERY 4 HOURS
Refills: 0 | Status: DISCONTINUED | OUTPATIENT
Start: 2022-09-29 | End: 2022-09-30

## 2022-09-29 RX ORDER — DIPHENHYDRAMINE HCL 50 MG
25 CAPSULE ORAL EVERY 6 HOURS
Refills: 0 | Status: DISCONTINUED | OUTPATIENT
Start: 2022-09-29 | End: 2022-09-30

## 2022-09-29 RX ORDER — TETANUS TOXOID, REDUCED DIPHTHERIA TOXOID AND ACELLULAR PERTUSSIS VACCINE, ADSORBED 5; 2.5; 8; 8; 2.5 [IU]/.5ML; [IU]/.5ML; UG/.5ML; UG/.5ML; UG/.5ML
0.5 SUSPENSION INTRAMUSCULAR ONCE
Refills: 0 | Status: DISCONTINUED | OUTPATIENT
Start: 2022-09-29 | End: 2022-09-30

## 2022-09-29 RX ORDER — AMPICILLIN TRIHYDRATE 250 MG
2 CAPSULE ORAL ONCE
Refills: 0 | Status: COMPLETED | OUTPATIENT
Start: 2022-09-29 | End: 2022-09-29

## 2022-09-29 RX ORDER — MAGNESIUM HYDROXIDE 400 MG/1
30 TABLET, CHEWABLE ORAL
Refills: 0 | Status: DISCONTINUED | OUTPATIENT
Start: 2022-09-29 | End: 2022-09-30

## 2022-09-29 RX ORDER — SODIUM CHLORIDE 9 MG/ML
3 INJECTION INTRAMUSCULAR; INTRAVENOUS; SUBCUTANEOUS EVERY 8 HOURS
Refills: 0 | Status: DISCONTINUED | OUTPATIENT
Start: 2022-09-29 | End: 2022-09-30

## 2022-09-29 RX ORDER — OXYCODONE HYDROCHLORIDE 5 MG/1
5 TABLET ORAL
Refills: 0 | Status: DISCONTINUED | OUTPATIENT
Start: 2022-09-29 | End: 2022-09-30

## 2022-09-29 RX ORDER — KETOROLAC TROMETHAMINE 30 MG/ML
30 SYRINGE (ML) INJECTION ONCE
Refills: 0 | Status: DISCONTINUED | OUTPATIENT
Start: 2022-09-29 | End: 2022-09-30

## 2022-09-29 RX ORDER — SODIUM CHLORIDE 9 MG/ML
1000 INJECTION, SOLUTION INTRAVENOUS
Refills: 0 | Status: DISCONTINUED | OUTPATIENT
Start: 2022-09-29 | End: 2022-09-29

## 2022-09-29 RX ORDER — IBUPROFEN 200 MG
600 TABLET ORAL EVERY 6 HOURS
Refills: 0 | Status: DISCONTINUED | OUTPATIENT
Start: 2022-09-29 | End: 2022-09-30

## 2022-09-29 RX ORDER — OXYTOCIN 10 UNIT/ML
333.33 VIAL (ML) INJECTION
Qty: 20 | Refills: 0 | Status: DISCONTINUED | OUTPATIENT
Start: 2022-09-29 | End: 2022-09-30

## 2022-09-29 RX ORDER — DIBUCAINE 1 %
1 OINTMENT (GRAM) RECTAL EVERY 6 HOURS
Refills: 0 | Status: DISCONTINUED | OUTPATIENT
Start: 2022-09-29 | End: 2022-09-30

## 2022-09-29 RX ORDER — PRAMOXINE HYDROCHLORIDE 150 MG/15G
1 AEROSOL, FOAM RECTAL EVERY 4 HOURS
Refills: 0 | Status: DISCONTINUED | OUTPATIENT
Start: 2022-09-29 | End: 2022-09-30

## 2022-09-29 RX ORDER — BENZOCAINE 10 %
1 GEL (GRAM) MUCOUS MEMBRANE EVERY 6 HOURS
Refills: 0 | Status: DISCONTINUED | OUTPATIENT
Start: 2022-09-29 | End: 2022-09-30

## 2022-09-29 RX ORDER — HYDROCORTISONE 1 %
1 OINTMENT (GRAM) TOPICAL EVERY 6 HOURS
Refills: 0 | Status: DISCONTINUED | OUTPATIENT
Start: 2022-09-29 | End: 2022-09-30

## 2022-09-29 RX ORDER — IBUPROFEN 200 MG
600 TABLET ORAL EVERY 6 HOURS
Refills: 0 | Status: COMPLETED | OUTPATIENT
Start: 2022-09-29 | End: 2023-08-28

## 2022-09-29 RX ORDER — AMPICILLIN TRIHYDRATE 250 MG
1 CAPSULE ORAL EVERY 4 HOURS
Refills: 0 | Status: DISCONTINUED | OUTPATIENT
Start: 2022-09-29 | End: 2022-09-29

## 2022-09-29 RX ORDER — OXYCODONE HYDROCHLORIDE 5 MG/1
5 TABLET ORAL ONCE
Refills: 0 | Status: DISCONTINUED | OUTPATIENT
Start: 2022-09-29 | End: 2022-09-30

## 2022-09-29 RX ORDER — ACETAMINOPHEN 500 MG
975 TABLET ORAL
Refills: 0 | Status: DISCONTINUED | OUTPATIENT
Start: 2022-09-29 | End: 2022-09-30

## 2022-09-29 RX ORDER — AER TRAVELER 0.5 G/1
1 SOLUTION RECTAL; TOPICAL EVERY 4 HOURS
Refills: 0 | Status: DISCONTINUED | OUTPATIENT
Start: 2022-09-29 | End: 2022-09-30

## 2022-09-29 RX ORDER — LANOLIN
1 OINTMENT (GRAM) TOPICAL EVERY 6 HOURS
Refills: 0 | Status: DISCONTINUED | OUTPATIENT
Start: 2022-09-29 | End: 2022-09-30

## 2022-09-29 RX ORDER — OXYTOCIN 10 UNIT/ML
333.33 VIAL (ML) INJECTION
Qty: 20 | Refills: 0 | Status: DISCONTINUED | OUTPATIENT
Start: 2022-09-29 | End: 2022-09-29

## 2022-09-29 RX ORDER — INFLUENZA VIRUS VACCINE 15; 15; 15; 15 UG/.5ML; UG/.5ML; UG/.5ML; UG/.5ML
0.5 SUSPENSION INTRAMUSCULAR ONCE
Refills: 0 | Status: DISCONTINUED | OUTPATIENT
Start: 2022-09-29 | End: 2022-09-29

## 2022-09-29 RX ADMIN — Medication 975 MILLIGRAM(S): at 21:22

## 2022-09-29 RX ADMIN — Medication 600 MILLIGRAM(S): at 11:24

## 2022-09-29 RX ADMIN — Medication 975 MILLIGRAM(S): at 21:37

## 2022-09-29 RX ADMIN — Medication 1 TABLET(S): at 11:24

## 2022-09-29 RX ADMIN — SODIUM CHLORIDE 125 MILLILITER(S): 9 INJECTION, SOLUTION INTRAVENOUS at 01:56

## 2022-09-29 RX ADMIN — Medication 200 GRAM(S): at 01:45

## 2022-09-29 RX ADMIN — SODIUM CHLORIDE 3 MILLILITER(S): 9 INJECTION INTRAMUSCULAR; INTRAVENOUS; SUBCUTANEOUS at 17:07

## 2022-09-29 RX ADMIN — Medication 600 MILLIGRAM(S): at 23:09

## 2022-09-29 RX ADMIN — SODIUM CHLORIDE 3 MILLILITER(S): 9 INJECTION INTRAMUSCULAR; INTRAVENOUS; SUBCUTANEOUS at 06:48

## 2022-09-29 RX ADMIN — Medication 600 MILLIGRAM(S): at 19:40

## 2022-09-29 RX ADMIN — Medication 600 MILLIGRAM(S): at 23:30

## 2022-09-29 NOTE — OB RN PATIENT PROFILE - PARENTS VERBALIZED UNDERSTANDING OF THE SAFE SKIN TO SKIN POSITIONING OF THE NEWBORN.
Pt said you sent his rx to [facility] instead of a mail order [facility] pharmacy [phone]. Does not know name of medication. Please call when done.   Statement Selected

## 2022-09-29 NOTE — OB RN PATIENT PROFILE - CHOOSE INDICATION TO IMMUNIZE (AN ORDER WILL BE GENERATED WHEN THIS NOTE IS SAVED):
Per verbal order by  Dr. Godoy, patient can use cheratussin. Medication sent to Dr. Godoy to sent to pharmacy.    Writer called patient and left a message with wife to call the clinic back.   Patient is pregnant regardless of trimester (administer thimerosal-free vaccine)

## 2022-09-29 NOTE — OB PROVIDER H&P - NS_BABIESUTERO_OBGYN_ALL_OB_NU
Gynecology Visit Note  6/15/17    Reason for visit: Annual exam    HPI: Patient is a 41 yo  who presents today for annual exam.  She has been doing well and her daughter is getting bigger and walking now.  She feels like they are much better adjusted than after her daughter was born.  Her parents have been staying with them to assist with caring for her daughter which has been nice, but they are returning to [place] soon.  Overall, she denies any major issues from a gynecological standpoint and just wants to make sure everything looks ok.  She continues to breastfeed and has not had return of menses.  They have not had intercourse often due to her parents being with them, and her  also has some libido issues secondary to depression medications.    Patient does state she has been having some issues with urinary urgency, not stress related.  Goes to bathroom often, but has always been the case.  Denies dysuria.  She occasionally has incontinence secondary to urge and inability to get to bathroom.  Wondering if anything can be done about this.    Past OB/GYN History:  : PLTCS for Cat II tracing RFD  Menses: No menses, still breastfeeding, plans for 2 years  Pap smear History: No history of abnormal, last 2016 was NILM, HPV negative  No STI history  Used IVF to conceive last pregnancy, unsure if will have any further pregnancies  No vaginal discharge or dyspareunia although limited intercourse currently as above    Past Medical History:   Diagnosis Date     History of IBS      Hx of previous reproductive problem      Pyelonephritis      Past Surgical History:   Procedure Laterality Date      SECTION N/A 3/15/2016    Procedure:  SECTION;  Surgeon: Maria R Andrade MD;  Location: [facility]     EYE SURGERY      Lasik     Current Outpatient Prescriptions   Medication     Probiotic Product (SOLUBLE FIBER/PROBIOTICS PO)     calcium-magnesium (CALMAG) 500-250 MG TABS     Prenatal  "Multivit-Min-Fe-FA (PRENATAL VITAMINS PO)     ibuprofen (ADVIL,MOTRIN) 600 MG tablet     Allergies   Allergen Reactions     Dicloxacillin Hives     Social History   Substance Use Topics     Smoking status: Never Smoker     Smokeless tobacco: Never Used     Alcohol use 0.0 oz/week     0 Standard drinks or equivalent per week     Family History   Problem Relation Age of Onset     Hypertension Father      oral      DIABETES Father      oral meds?      ROS:  Answers for HPI/ROS submitted by the patient on 6/1/2017, answers reviewed and unchanged on 6/15/17   SUJEY 7 TOTAL SCORE: 0  PHQ-2 Score: 3  If you checked off any problems, how difficult have these problems made it for you to do your work, take care of things at home, or get along with other people?: Not difficult at all  PHQ9 TOTAL SCORE: 1  General Symptoms: No  Skin Symptoms: No  HENT Symptoms: No  EYE SYMPTOMS: No  HEART SYMPTOMS: No  LUNG SYMPTOMS: No  INTESTINAL SYMPTOMS: No  URINARY SYMPTOMS: Yes  GYNECOLOGIC SYMPTOMS: No  BREAST SYMPTOMS: No  SKELETAL SYMPTOMS: No  BLOOD SYMPTOMS: No  NERVOUS SYSTEM SYMPTOMS: No  MENTAL HEALTH SYMPTOMS: No  Trouble holding urine or incontinence: Yes  Pain or burning: No  Trouble starting or stopping: No  Increased frequency of urination: Yes  Blood in urine: No  Decreased frequency of urination: No  Frequent nighttime urination: No  Flank pain: No  Difficulty emptying bladder: No    O:  Vitals:    06/15/17 1050   BP: 104/71   Pulse: 77   Weight: 52.3 kg (115 lb 6.4 oz)   Height: 1.6 m (5' 3\")     General: NAD, A&Ox3  Neck: No thyromegaly, No thyroid nodules appreciated  Lungs: CTA B/L  CV: RRR  Breasts: Symmetrical, No lymphadenopathy, skin changes, nipple discharge or nodules appreciated bilaterally  Abdomen: Soft, NT, ND  Genitourinary:   External Genitalia:  General appearance; normal, Hair distribution; normal, Lesions absent  Urethral Meatus:  Size normal, Location normal, Lesions absent, Prolapse absent  Urethra:  " Fullness absent, Masses absent  Bladder:  Fullness absent, Masses absent, Tenderness absent  Vagina:  General appearance normal, Estrogen effect normal, Discharge absent, Lesions absent  Cervix:  General appearance normal, Lesions absent, Discharge absent, Tenderness absent, Enlargement absent, Nodularity absent  Uterus:  Size normal, Position normal, Masses absent, Tenderness absent  Adenexa:  Masses absent, Tenderness absent     A/P: 41 yo  presents for annual exam, with additional concerns of urinary urgency, frequency and incontinence  1) Normal breast and pelvic exam  2) Screening for malignant neoplasm of cervix: No due until 4/2021  3) Urinary symptoms: Not every day, had some of this prior to pregnancy as well.  Given referral to Urogynecology to discuss options for management.  Patient unsure of future pregnancies given age and need for IVF with this pregnancy.  4) Contraception: None desired, IVF with last pregnancy and if was able to conceive on own would be ok with this  5) Breast cancer screening: Patient has not had mammogram yet, discussed recommendations for screening with patient and agreeable to mammogram, orders placed today  6) RTC in 1 year for annual, earlier with any concerns    Maria R Andrade MD     1

## 2022-09-29 NOTE — OB RN PATIENT PROFILE - FALL HARM RISK - UNIVERSAL INTERVENTIONS
Bed in lowest position, wheels locked, appropriate side rails in place/Call bell, personal items and telephone in reach/Instruct patient to call for assistance before getting out of bed or chair/Non-slip footwear when patient is out of bed/[place] to call system/Physically safe environment - no spills, clutter or unnecessary equipment/Purposeful Proactive Rounding/Room/bathroom lighting operational, light cord in reach

## 2022-09-29 NOTE — OB RN PATIENT PROFILE - NSICDXPASTMEDICALHX_GEN_ALL_CORE_FT
Pt calling .  Form filled out and placed at .    Patient will come to [place] office to : prescription and form.   Patient was advised of location and hours: Yes.   Patient was advised to bring photo identification: Yes.   Patient elects another party to  item: no.   PAST MEDICAL HISTORY:  Heart murmur unknown type

## 2022-09-29 NOTE — OB PROVIDER H&P - ASSESSMENT
32y  at 39w3d, GBS positive, elevated blood pressures on presentation, in labor at term,  -Admit to L+D  -Monitor EFM and TOCO   -IVF and labs  -Pain control PRN  -Clear liquid diet as tolerated  -f/u preeclamptic labs  -ampicillin for GBS ppx    Dr. Salomon and Dr. Frey aware

## 2022-09-29 NOTE — OB RN PATIENT PROFILE - NS_OBGYNHISTORY_OBGYN_ALL_OB_FT
ob hx:   FT  x1, 7-8lb, M, uncomplicated  etop x3, D&C x1    gyn hx: known fibroid uterus (see sonogram for sizes) known for at least 6 years, never caused patient problems; denies history of ovarian cysts, abnormal paps, stis
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
## 2022-09-29 NOTE — PRE-ANESTHESIA EVALUATION ADULT - NSANTHRISKNONERD_GEN_ALL_CORE
Detail Level: Zone
Additional Notes: Patient discussed lasers for treatment with Karie during her visit.
No risk alerts present

## 2022-09-29 NOTE — OB PROVIDER H&P - NSHPPHYSICALEXAM_GEN_ALL_CORE
T(C): 36.8 (09-29-22 @ 01:28), Max: 36.8 (09-29-22 @ 01:28)  HR: 93 (09-29-22 @ 01:29) (93 - 93)  BP: 143/83 (09-29-22 @ 01:29) (143/83 - 143/83)  RR: 14 (09-29-22 @ 01:28) (14 - 14)    Gen: A+OX3. NAD  Abd: Soft, Nontender. Gravid. Palpable contractions. No RUQ/epigastric pain  Ext: no edema, reflexes 2+ in BL UE  SVE: 8/100/0, vtx, intact bulging membranes    FHR: 120BPM/mod stefani  TOCO: q2min      EFW by Leopolds: [id number]

## 2022-09-29 NOTE — OB PROVIDER DELIVERY SUMMARY - NSSELHIDDEN_OBGYN_ALL_OB_FT
[NS_DeliveryAttending1_OBGYN_ALL_OB_FT:[email or web address]=],[NS_DeliveryRN_OBGYN_ALL_OB_FT:[email or web address]=],[NS_CirculateRN2_OBGYN_ALL_OB_FT:[email or web address]=]

## 2022-09-29 NOTE — OB PROVIDER DELIVERY SUMMARY - NSPROVIDERDELIVERYNOTE_OBGYN_ALL_OB_FT
Patient was fully dilated and pushed. Fetal head delivered OA, and restituted to LOT. The anterior and  posterior shoulders delivered, followed by the remaining body atraumatically. The  was handed to mother for skin to skin. Delayed cord clamping was performed for 1 minute, and then was clamped and cut. Cord blood & gases collected.  The placenta delivered spontaneously intact with 3v cord. Uterus massaged and firm with oxytocin given IV, patient stable. Cervix, vagina and perineum inspected. Repair of periurethral tear with 3-0 chromic.   live male, APGARS 9/9 delivered. EBL -300, hemostasis assured, uterus firm, patient in stable condition.

## 2022-09-29 NOTE — PROCEDURE NOTE - NSDELPLAN_OBGYN_ALL_OB
Subjective:      Patient ID: Nany Crenshaw is a 89 y.o. female.    Chief Complaint: Peripheral Vascular Disease (bialteral feet from callus pain (Pcp Dr. Mcdaniel 2/6/17))    Nany is a 89 y.o. female who presents to the clinic for evaluation and treatment of high risk feet. Nany has a past medical history of Colon polyp, Coronary artery disease, Diverticulosis, Fracture of right distal radius, GERD (gastroesophageal reflux disease), Hypertension, Hypothyroidism (acquired), Osteoporosis, post-menopausal, and Scoliosis. The patient's chief complaint is long, thick toenails. This patient has documented high risk feet requiring routine maintenance secondary to peripheral vascular disease.    PCP: Ivette Mcdaniel MD    Date Last Seen by PCP: none found    Current shoe gear:  Affected Foot: Tennis shoes     Unaffected Foot: Tennis shoes    Last encounter in this department: Visit date not found    Hemoglobin A1C   Date Value Ref Range Status   01/20/2005 5.6 4.5 - 6.2 % Final       Review of Systems   Constitution: Negative for chills, diaphoresis and fever.   Cardiovascular: Negative for claudication, cyanosis, leg swelling and syncope.   Respiratory: Negative for cough and shortness of breath.    Skin: Positive for color change, nail changes and suspicious lesions.   Musculoskeletal: Negative for falls, joint pain, muscle cramps and muscle weakness.   Gastrointestinal: Negative for diarrhea, nausea and vomiting.   Neurological: Negative for disturbances in coordination, numbness, paresthesias, sensory change, tremors and weakness.   Psychiatric/Behavioral: Negative for altered mental status.           Objective:      Physical Exam   Constitutional: She appears well-developed.   Cardiovascular:   Dorsalis pedis and posterior tibial pulses are diminished Bilaterally. Toes are cool to touch. Feet are warm proximally.There is decreased digital hair. Skin is atrophic, slightly hyperpigmented, and mildly edematous     Pulmonary/Chest: No respiratory distress.   Musculoskeletal: She exhibits tenderness. She exhibits no edema.   Adequate joint range of motion without pain, limitation, nor crepitation Bilateral feet and ankle joints. Muscle strength is 5/5 in all groups bilaterally.    Feet:   Right Foot:   Skin Integrity: Positive for callus and dry skin. Negative for ulcer or skin breakdown.   Left Foot:   Skin Integrity: Positive for dry skin. Negative for ulcer.   Neurological: She is alert.   [place] 5.07 monofilamant testing is diminished Jerald feet. Sharp/dull sensation diminished Bilaterally. Light touch absent Bilaterally.    Skin: Skin is dry. No erythema.   Nails x10 are elongated by 2-6mm's, thickened by 3-5 mm's, dystrophic, and are darkened in coloration . Xerosis Bilaterally.     Focal hyperkeratotic lesion consisting entirely of hyperkeratotic tissue without open skin, drainage, pus, fluctuance, malodor, or signs of infection: right submet head 2 and left submet 5.        Psychiatric: She has a normal mood and affect.             Assessment:       Encounter Diagnoses   Name Primary?    Onychomycosis due to dermatophyte Yes    Peripheral vascular disease, unspecified     Corn or callus          Plan:       Nany was seen today for peripheral vascular disease.    Diagnoses and all orders for this visit:    Onychomycosis due to dermatophyte    Peripheral vascular disease, unspecified    Corn or callus      I counseled the patient on her conditions, their implications and medical management.    Shoe inspection. Foot Education. Patient instructed on proper foot hygeine. We discussed wearing proper shoe gear, daily foot inspections, never walking without protective shoe gear, never putting sharp instruments to feet.  We also discussed padding and shoes with high toe boxes for  foot deformities.     - With patient's permission, right 1, 2, 3, 4, 5 and left 1, 2, 3, 4, 5 toenails were aggressively reduced and  debrided  to their soft tissue attachment mechanically with nail nipper, removing all offending nail and debris. The porokeratotic tissue was pared x 2 with a 15 blade right submet head 2 and left submet 5 .  Patient relates relief following the procedure. Patient will continue to monitor the areas daily, inspect her feet, wear protective shoe gear when ambulatory, moisturizer to maintain skin integrity and follow in this office in approximately 3 months, sooner p.r.n.    At patient's request, I discussed different treatments for toenail fungus. We discussed oral antifungals but I did not recommend them as a first line treatment since the medication is taken internally and can have side effects such as rash, taste disturbances, and liver enzyme elevation. We discussed topical Penlac to be applied daily and removed weekly. Pt. Expresses understanding and would like to try the Penlac. Rx sent to the pharmacy.         F/u  9 weeks     Marisol Newman DPM             Vaginal

## 2022-09-29 NOTE — OB PROVIDER H&P - HISTORY OF PRESENT ILLNESS
32yo  at 39w1d dated by LMP c/w first trimester sono with YUMIKO 10/3/22 presenting due to CTX since earleir today, now every few minutes,  rated 10/10. Denies LOF, VB. Good FM. Denies complications this pregnancy. GBS positive.

## 2022-09-29 NOTE — OB RN PATIENT PROFILE - PRO INTERPRETER NEED 2
Patient Education     Acute Otitis Media with Infection (Child)    Your child has a middle ear infection (acute otitis media). It is caused by bacteria or fungi. The middle ear is the space behind the eardrum. The eustachian tube connects the ear to the nasal passage. The eustachian tubes help drain fluid from the ears. They also keep the air pressure equal inside and outside the ears. These tubes are shorter and more horizontal in children. This makes it more likely for the tubes to become blocked. A blockage lets fluid and pressure build up in the middle ear. Bacteria or fungi can grow in this fluid and cause an ear infection. This infection is commonly known as an earache.  The main symptom of an ear infection is ear pain. Other symptoms may include pulling at the ear, being more fussy than usual, decreased appetite, and vomiting or diarrhea. Your child’s hearing may also be affected. Your child may have had a respiratory infection first.  An ear infection may clear up on its own. Or your child may need to take medicine. After the infection goes away, your child may still have fluid in the middle ear. It may take weeks or months for this fluid to go away. During that time, your child may have temporary hearing loss. But all other symptoms of the earache should be gone.  Home care  Follow these guidelines when caring for your child at home:  · The healthcare provider will likely prescribe medicines for pain. The provider may also prescribe antibiotics or antifungals to treat the infection. These may be liquid medicines to give by mouth. Or they may be ear drops. Follow the provider’s instructions for giving these medicines to your child.  · Because ear infections can clear up on their own, the provider may suggest waiting for a few days before giving your child medicines for infection.  · To reduce pain, have your child rest in an upright position. Hot or cold compresses held against the ear may help ease  pain.  · Keep the ear dry. Have your child wear a shower cap when bathing.  To help prevent future infections:  · Don't smoke near your child. Secondhand smoke raises the risk for ear infections in children.  · Make sure your child gets all appropriate vaccines.  · Do not bottle-feed while your baby is lying on his or her back. (This position can cause middle ear infections because it allows milk to run into the eustachian tubes.)      · If you breastfeed, continue until your child is 6 to 12 months of age.  To apply ear drops:  1. Put the bottle in warm water if the medicine is kept in the refrigerator. Cold drops in the ear are uncomfortable.  2. Have your child lie down on a flat surface. Gently hold your child’s head to 1 side.  3. Remove any drainage from the ear with a clean tissue or cotton swab. Clean only the outer ear. Don’t put the cotton swab into the ear canal.  4. Straighten the ear canal by gently pulling the earlobe up and back.  5. Keep the dropper a half-inch above the ear canal. This will keep the dropper from becoming contaminated. Put the drops against the side of the ear canal.  6. Have your child stay lying down for 2 to 3 minutes. This gives time for the medicine to enter the ear canal. If your child doesn’t have pain, gently massage the outer ear near the opening.  7. Wipe any extra medicine away from the outer ear with a clean cotton ball.  Follow-up care  Follow up with your child’s healthcare provider as directed. Your child will need to have the ear rechecked to make sure the infection has gone away. Check with the healthcare provider to see when they want to see your child.  Special note to parents  If your child continues to get earaches, he or she may need ear tubes. The provider will put small tubes in your child’s eardrum to help keep fluid from building up. This procedure is a simple and works well.  When to seek medical advice  Unless advised otherwise, call your child's  healthcare provider if:  · Your child is 3 months old or younger and has a fever of 100.4°F (38°C) or higher. Your child may need to see a healthcare provider.  · Your child is of any age and has fevers higher than 104°F (40°C) that come back again and again.  Call your child's healthcare provider for any of the following:  · New symptoms, especially swelling around the ear or weakness of face muscles  · Severe pain  · Infection seems to get worse, not better   · Neck pain  · Your child acts very sick or not himself or herself  · Fever or pain do not improve with antibiotics after 48 hours  Date Last Reviewed: 10/1/2017  © 8894-3773 [employer]. [address] [place] [address], [place] [address]. All rights reserved. This information is not intended as a substitute for professional medical care. Always follow your healthcare professional's instructions.            English

## 2022-09-29 NOTE — OB RN DELIVERY SUMMARY - NSSELHIDDEN_OBGYN_ALL_OB_FT
[NS_DeliveryAttending1_OBGYN_ALL_OB_FT:[id number]=],[NS_DeliveryRN_OBGYN_ALL_OB_FT:[email or web address]=],[NS_CirculateRN2_OBGYN_ALL_OB_FT:[email or web address]=]

## 2022-09-29 NOTE — OB PROVIDER H&P - CURRENT PREGNANCY COMPLICATIONS, OB PROFILE
[FreeTextEntry1] : 70 M who is here for initial visit for right facial twitching. Will check hct w contrast for any lesions in the left hemisphere causing facial twitch. he will also have EEG to help capture these movements.\par fu after the study's completed.\par Patient was advised to continue to monitor for neurologic symptoms and to notify my office or go to the nearest emergency room if there are any changes. Neurologic issues were discussed with the patient. All questions were answered to complete satisfaction. Education was provided to the patient during this encounter.\par Side effects of the above medications were discussed in detail including but not limited to applicable black box warning and teratogenicity as appropriate. \par Patient was advised to bring previous records to my office. \par \par  None

## 2022-09-30 ENCOUNTER — APPOINTMENT (OUTPATIENT)
Dept: ANTEPARTUM | Facility: CLINIC | Age: 32
End: 2022-09-30

## 2022-09-30 ENCOUNTER — TRANSCRIPTION ENCOUNTER (OUTPATIENT)
Age: 32
End: 2022-09-30

## 2022-09-30 VITALS
RESPIRATION RATE: 18 BRPM | DIASTOLIC BLOOD PRESSURE: 59 MMHG | TEMPERATURE: 96 F | SYSTOLIC BLOOD PRESSURE: 99 MMHG | HEART RATE: 95 BPM

## 2022-09-30 RX ORDER — ACETAMINOPHEN 500 MG
3 TABLET ORAL
Qty: 0 | Refills: 0 | DISCHARGE
Start: 2022-09-30

## 2022-09-30 RX ORDER — IBUPROFEN 200 MG
1 TABLET ORAL
Qty: 0 | Refills: 0 | DISCHARGE
Start: 2022-09-30

## 2022-09-30 RX ADMIN — Medication 975 MILLIGRAM(S): at 10:28

## 2022-09-30 RX ADMIN — Medication 600 MILLIGRAM(S): at 06:49

## 2022-09-30 RX ADMIN — Medication 1 TABLET(S): at 12:44

## 2022-09-30 RX ADMIN — Medication 600 MILLIGRAM(S): at 06:00

## 2022-09-30 RX ADMIN — Medication 975 MILLIGRAM(S): at 15:15

## 2022-09-30 RX ADMIN — Medication 600 MILLIGRAM(S): at 12:44

## 2022-09-30 NOTE — DISCHARGE NOTE OB - PATIENT PORTAL LINK FT
You can access the FollowMyHealth Patient Portal offered by [facility] by registering at the following website: [email or web address]. By joining [facility] FollowMyHealth portal, you will also be able to view your health information using other applications (apps) compatible with our system.

## 2022-09-30 NOTE — PROGRESS NOTE ADULT - SUBJECTIVE AND OBJECTIVE BOX
s/p   doing well, oob, voiding c/o burning with urination due to periurethral tear,   attempting breastfeeding  vss aftebrile  nl heart and lungs exam   abd - nl nt , uterine fundus firm below umbilicus  lochia - min   extr  nt ne    hgb--10 now wbc 15  a/p  p2 s/p    doing well  dibucaine  pnv  f/up in office 6 wks  d/c home     
31 yo p1031 at 39# wks presented in active labor 8 cm . Patient received ampicillin for GBS pos. She received epidural for pain management  I reexamined her at 3:15 am and AROM/clear was done, patient was ve - anterior lip/vtx/adequate pelvis .  efw 3400 gm   vss  shortly after that patient became fully dialated and we began pushing with patient.  She delivered baby boy  at 3:30 am , head delivered atraumatically , baby nose and mouth suctioned and baby placed on mom ,  cord clamped and cut and cord gasses and bloods collected.  small periurethral laceration repaired with 3-0 chromic , small right periurethral hymenal sebaceous cyst drained  placenta delivered intact spontaneously intact w 3vc , pitocin infused , uterus firm.  Patient and baby ( apgars 9/9) is doing well.

## 2022-09-30 NOTE — DISCHARGE NOTE OB - MEDICATION SUMMARY - MEDICATIONS TO TAKE
I will START or STAY ON the medications listed below when I get home from the hospital:    acetaminophen 325 mg oral tablet  -- 3 tab(s) by mouth every 6 hours, As Needed  -- Indication: For pain    ibuprofen 600 mg oral tablet  -- 1 tab(s) by mouth every 6 hours, As Needed  -- Indication: For pain    busPIRone 10 mg oral tablet  -- 1 tab(s) by mouth 2 times a day  -- Indication: For home medication    Prenatal Multivitamins with Folic Acid 1 mg oral tablet  -- 1 tab(s) by mouth once a day  -- Indication: For postpartum

## 2022-09-30 NOTE — DISCHARGE NOTE OB - NS MD DC FALL RISK RISK
For information on Fall & Injury Prevention, visit: [email or web address] OR  [email or web address] OR  https://www.cdc.gov/steadi/patient.html

## 2022-09-30 NOTE — DISCHARGE NOTE OB - CARE PROVIDER_API CALL
Sandra Frey)  Obstetrics and Gynecology  [address] [place] [address], [address]  [place] [address]  Phone: [phone]  Fax: [phone]  Established Patient  Follow Up Time: Routine

## 2022-10-04 ENCOUNTER — APPOINTMENT (OUTPATIENT)
Dept: OBGYN | Facility: CLINIC | Age: 32
End: 2022-10-04

## 2022-10-07 ENCOUNTER — APPOINTMENT (OUTPATIENT)
Dept: ANTEPARTUM | Facility: CLINIC | Age: 32
End: 2022-10-07

## 2022-10-07 ENCOUNTER — NON-APPOINTMENT (OUTPATIENT)
Age: 32
End: 2022-10-07

## 2022-10-10 ENCOUNTER — NON-APPOINTMENT (OUTPATIENT)
Age: 32
End: 2022-10-10

## 2022-10-10 DIAGNOSIS — N89.8 OTHER SPECIFIED NONINFLAMMATORY DISORDERS OF VAGINA: ICD-10-CM

## 2022-10-12 ENCOUNTER — APPOINTMENT (OUTPATIENT)
Dept: SURGERY | Facility: CLINIC | Age: 32
End: 2022-10-12

## 2022-11-02 ENCOUNTER — NON-APPOINTMENT (OUTPATIENT)
Age: 32
End: 2022-11-02

## 2022-11-08 ENCOUNTER — APPOINTMENT (OUTPATIENT)
Dept: OBGYN | Facility: CLINIC | Age: 32
End: 2022-11-08

## 2023-01-31 ENCOUNTER — TRANSCRIPTION ENCOUNTER (OUTPATIENT)
Age: 33
End: 2023-01-31

## 2023-01-31 ENCOUNTER — APPOINTMENT (OUTPATIENT)
Dept: OBGYN | Facility: CLINIC | Age: 33
End: 2023-01-31
Payer: COMMERCIAL

## 2023-01-31 VITALS
SYSTOLIC BLOOD PRESSURE: 110 MMHG | DIASTOLIC BLOOD PRESSURE: 72 MMHG | BODY MASS INDEX: 43.55 KG/M2 | WEIGHT: 216 LBS | HEART RATE: 78 BPM | HEIGHT: 59 IN

## 2023-01-31 PROCEDURE — 99213 OFFICE O/P EST LOW 20 MIN: CPT

## 2023-01-31 NOTE — PHYSICAL EXAM
[Labia Majora] : normal [Labia Minora] : normal [No Bleeding] : There was no active vaginal bleeding [Discharge] : discharge [Normal] : normal [Uterine Adnexae] : normal

## 2023-01-31 NOTE — HISTORY OF PRESENT ILLNESS
[Patient reported PAP Smear was normal] : Patient reported PAP Smear was normal [Y] : Positive pregnancy history [TextBox_4] : h/o fibroids \par no cysts , no std\par 13/28/5\par h/o genital warts in 2010  [Papeardate] :  [[facility]] : 2 [PGHxAbortions] : 3 [[facility]] : 2 [FreeTextEntry1] :  ft  Boy 7'8 , no issues, [facility], different partner ,  ft  baby boy 6'6 , 1 top 12weeks  d &C , 2 medical top

## 2023-01-31 NOTE — DISCUSSION/SUMMARY
[FreeTextEntry1] : 33 YO P2 R/O BV, S/P   2022\par VAG CX\par Flagyl 500 bid 1 week\par Diflucan 150 po x1\par Patient will call for OE PATCH SCRIPT IF DECIDES SHE NEEDS TO RESTART CONTRACEPTION.\par Rto for annual exam \par

## 2023-02-06 LAB
A VAGINAE DNA VAG QL NAA+PROBE: ABNORMAL
BVAB2 DNA VAG QL NAA+PROBE: ABNORMAL
C KRUSEI DNA VAG QL NAA+PROBE: NEGATIVE
C TRACH RRNA SPEC QL NAA+PROBE: NEGATIVE
MEGA1 DNA VAG QL NAA+PROBE: ABNORMAL
N GONORRHOEA RRNA SPEC QL NAA+PROBE: NEGATIVE
T VAGINALIS RRNA SPEC QL NAA+PROBE: NEGATIVE

## 2023-02-07 ENCOUNTER — NON-APPOINTMENT (OUTPATIENT)
Age: 33
End: 2023-02-07

## 2023-02-08 ENCOUNTER — NON-APPOINTMENT (OUTPATIENT)
Age: 33
End: 2023-02-08

## 2023-02-12 LAB
HBV SURFACE AB SER QL: NONREACTIVE
HBV SURFACE AG SER QL: NONREACTIVE
HIV1+2 AB SPEC QL IA.RAPID: NONREACTIVE
T PALLIDUM AB SER QL IA: NEGATIVE

## 2023-02-13 LAB
HCV RNA SERPL NAA+PROBE-LOG IU: NOT DETECTED LOGIU/ML
HEPC RNA INTERP: NOT DETECTED IU/ML

## 2023-03-14 ENCOUNTER — APPOINTMENT (OUTPATIENT)
Dept: OBGYN | Facility: CLINIC | Age: 33
End: 2023-03-14
Payer: COMMERCIAL

## 2023-03-14 PROCEDURE — 99442: CPT

## 2023-03-20 ENCOUNTER — RX RENEWAL (OUTPATIENT)
Age: 33
End: 2023-03-20

## 2023-04-05 ENCOUNTER — APPOINTMENT (OUTPATIENT)
Dept: CARDIOLOGY | Facility: CLINIC | Age: 33
End: 2023-04-05

## 2023-04-19 ENCOUNTER — APPOINTMENT (OUTPATIENT)
Dept: CARDIOLOGY | Facility: CLINIC | Age: 33
End: 2023-04-19
Payer: COMMERCIAL

## 2023-04-19 VITALS — HEART RATE: 72 BPM | HEIGHT: 59 IN | DIASTOLIC BLOOD PRESSURE: 70 MMHG | SYSTOLIC BLOOD PRESSURE: 118 MMHG

## 2023-04-19 VITALS — WEIGHT: 218 LBS | BODY MASS INDEX: 44.03 KG/M2

## 2023-04-19 DIAGNOSIS — I47.1 SUPRAVENTRICULAR TACHYCARDIA: ICD-10-CM

## 2023-04-19 DIAGNOSIS — R00.2 PALPITATIONS: ICD-10-CM

## 2023-04-19 PROCEDURE — 93000 ELECTROCARDIOGRAM COMPLETE: CPT

## 2023-04-19 PROCEDURE — 99204 OFFICE O/P NEW MOD 45 MIN: CPT

## 2023-04-19 RX ORDER — TERCONAZOLE 8 MG/G
0.8 CREAM VAGINAL
Qty: 1 | Refills: 0 | Status: COMPLETED | COMMUNITY
Start: 2022-04-19 | End: 2023-04-19

## 2023-04-19 RX ORDER — METRONIDAZOLE 7.5 MG/G
0.75 GEL VAGINAL
Qty: 1 | Refills: 0 | Status: COMPLETED | COMMUNITY
Start: 2022-10-10 | End: 2023-04-19

## 2023-04-19 RX ORDER — NITROFURANTOIN (MONOHYDRATE/MACROCRYSTALS) 25; 75 MG/1; MG/1
100 CAPSULE ORAL
Qty: 10 | Refills: 0 | Status: COMPLETED | COMMUNITY
Start: 2022-03-21 | End: 2023-04-19

## 2023-04-19 RX ORDER — LEVONORGESTREL 1.5 MG/1
1.5 TABLET ORAL
Qty: 1 | Refills: 0 | Status: COMPLETED | COMMUNITY
Start: 2023-03-20 | End: 2023-04-19

## 2023-04-19 RX ORDER — FLUCONAZOLE 150 MG/1
150 TABLET ORAL
Qty: 2 | Refills: 0 | Status: COMPLETED | COMMUNITY
Start: 2023-03-14 | End: 2023-04-19

## 2023-04-19 RX ORDER — SECNIDAZOLE 2 G/4.8G
2 GRANULE ORAL
Qty: 1 | Refills: 0 | Status: COMPLETED | COMMUNITY
Start: 2023-03-14 | End: 2023-04-19

## 2023-04-19 RX ORDER — INDOMETHACIN 25 MG/1
25 CAPSULE ORAL EVERY 8 HOURS
Qty: 6 | Refills: 0 | Status: COMPLETED | COMMUNITY
Start: 2022-04-28 | End: 2023-04-19

## 2023-04-19 RX ORDER — CELECOXIB 200 MG/1
200 CAPSULE ORAL TWICE DAILY
Qty: 8 | Refills: 0 | Status: COMPLETED | COMMUNITY
Start: 2021-10-19 | End: 2023-04-19

## 2023-04-19 RX ORDER — PNV NO.95/FERROUS FUM/FOLIC AC 28MG-0.8MG
28-0.8 TABLET ORAL DAILY
Qty: 30 | Refills: 11 | Status: COMPLETED | COMMUNITY
Start: 2022-02-15 | End: 2023-04-19

## 2023-04-19 RX ORDER — METRONIDAZOLE 500 MG/1
500 TABLET ORAL
Qty: 14 | Refills: 0 | Status: COMPLETED | COMMUNITY
Start: 2023-01-31 | End: 2023-04-19

## 2023-04-19 RX ORDER — LEVONORGESTREL 1.5 MG/1
1.5 TABLET ORAL
Qty: 1 | Refills: 1 | Status: COMPLETED | COMMUNITY
Start: 2023-01-31 | End: 2023-04-19

## 2023-04-19 RX ORDER — DOCUSATE SODIUM 100 MG/1
100 CAPSULE ORAL TWICE DAILY
Qty: 60 | Refills: 1 | Status: COMPLETED | COMMUNITY
Start: 2022-07-01 | End: 2023-04-19

## 2023-04-19 RX ORDER — GABAPENTIN 300 MG/1
300 CAPSULE ORAL
Qty: 7 | Refills: 0 | Status: COMPLETED | COMMUNITY
Start: 2021-10-19 | End: 2023-04-19

## 2023-04-19 RX ORDER — DOCUSATE SODIUM 100 MG/1
100 CAPSULE ORAL TWICE DAILY
Qty: 60 | Refills: 1 | Status: COMPLETED | COMMUNITY
Start: 2022-04-29 | End: 2023-04-19

## 2023-04-19 RX ORDER — TERCONAZOLE 8 MG/G
0.8 CREAM VAGINAL
Qty: 1 | Refills: 0 | Status: COMPLETED | OUTPATIENT
Start: 2022-03-16 | End: 2023-04-19

## 2023-04-19 RX ORDER — FAMOTIDINE 20 MG/1
20 TABLET, FILM COATED ORAL
Qty: 60 | Refills: 0 | Status: COMPLETED | COMMUNITY
Start: 2022-08-24 | End: 2023-04-19

## 2023-04-19 RX ORDER — TERCONAZOLE 8 MG/G
0.8 CREAM VAGINAL
Qty: 1 | Refills: 0 | Status: COMPLETED | COMMUNITY
Start: 2022-06-30 | End: 2023-04-19

## 2023-04-19 RX ORDER — FLUCONAZOLE 150 MG/1
150 TABLET ORAL
Qty: 1 | Refills: 0 | Status: COMPLETED | COMMUNITY
Start: 2023-01-31 | End: 2023-04-19

## 2023-04-19 RX ORDER — AMPICILLIN 500 MG/1
500 CAPSULE ORAL
Qty: 14 | Refills: 0 | Status: COMPLETED | COMMUNITY
Start: 2022-04-04 | End: 2023-04-19

## 2023-04-19 RX ORDER — FAMOTIDINE 20 MG/1
20 TABLET, FILM COATED ORAL
Qty: 30 | Refills: 0 | Status: COMPLETED | COMMUNITY
Start: 2022-06-30 | End: 2023-04-19

## 2023-04-19 RX ORDER — CHLORHEXIDINE GLUCONATE 4 %
325 (65 FE) LIQUID (ML) TOPICAL
Qty: 60 | Refills: 2 | Status: COMPLETED | OUTPATIENT
Start: 2022-04-29 | End: 2023-04-19

## 2023-04-19 NOTE — CARDIOLOGY SUMMARY
[de-identified] : 4- NSR Early repolarization  [de-identified] : Average  . Short runs of PSVT  [de-identified] : 3-1-2023 Normal LV systolic fucntion Trace MR and TR .

## 2023-04-19 NOTE — HISTORY OF PRESENT ILLNESS
[FreeTextEntry1] : The patient has delivered 6 months ago . She never had HTN and the pregnancy was uncomplicated . The patient has had a popping feeling in her chest which lasts for a few seconds but would tend to recur . The patient has had no SOB . No chest pain with th eeception of above . She has a monitor placed which showed runs of PSVT and PAC's .

## 2023-04-19 NOTE — REASON FOR VISIT
[Symptom and Test Evaluation] : symptom and test evaluation [Arrhythmia/ECG Abnorrmalities] : arrhythmia/ECG abnormalities [FreeTextEntry3] : Dr. Magana

## 2023-04-19 NOTE — ASSESSMENT
[FreeTextEntry1] : The patient has had a normal pregnancy recently . She has had periods of her chest "popping" . The patient had a 5 day ? epatch which showed periods of what looks like PSVT which are sle limited . The patient had an echo from her PCP which showed essentially a structurally normal heart . Her BP is low normal and she does have some pauses after PAC's and short runs of PSVT . Uncertain if she bhupendra tolerate beta blockers . She has early repolarization on ECG . D dimer was normal and her TFT's are euthyroid .

## 2023-04-19 NOTE — REVIEW OF SYSTEMS
[Feeling Fatigued] : feeling fatigued [Palpitations] : palpitations [Joint Pain] : joint pain [Joint Swelling] : joint swelling [Anxiety] : anxiety [Negative] : Neurological [FreeTextEntry2] : thinks that she snores

## 2023-06-15 ENCOUNTER — APPOINTMENT (OUTPATIENT)
Dept: OBGYN | Facility: CLINIC | Age: 33
End: 2023-06-15

## 2023-06-23 ENCOUNTER — APPOINTMENT (OUTPATIENT)
Dept: OBGYN | Facility: CLINIC | Age: 33
End: 2023-06-23
Payer: COMMERCIAL

## 2023-06-23 PROCEDURE — 99442: CPT

## 2023-06-23 RX ORDER — NORELGESTROMIN AND ETHINYL ESTRADIOL 150; 35 UG/D; UG/D
150-35 PATCH TRANSDERMAL
Qty: 4 | Refills: 6 | Status: ACTIVE | COMMUNITY
Start: 2023-06-23 | End: 1900-01-01

## 2023-07-10 ENCOUNTER — RX RENEWAL (OUTPATIENT)
Age: 33
End: 2023-07-10

## 2023-07-10 RX ORDER — NORELGESTROMIN AND ETHINLY ESTRADIOL 150; 35 UG/D; UG/D
150-35 PATCH TRANSDERMAL
Qty: 4 | Refills: 5 | Status: ACTIVE | COMMUNITY
Start: 2023-07-10 | End: 1900-01-01

## 2023-08-09 ENCOUNTER — APPOINTMENT (OUTPATIENT)
Dept: CARDIOLOGY | Facility: CLINIC | Age: 33
End: 2023-08-09

## 2023-08-17 ENCOUNTER — APPOINTMENT (OUTPATIENT)
Dept: CARDIOLOGY | Facility: CLINIC | Age: 33
End: 2023-08-17

## 2023-09-13 ENCOUNTER — APPOINTMENT (OUTPATIENT)
Dept: OBGYN | Facility: CLINIC | Age: 33
End: 2023-09-13

## 2024-03-06 ENCOUNTER — APPOINTMENT (OUTPATIENT)
Dept: OBGYN | Facility: CLINIC | Age: 34
End: 2024-03-06
Payer: MEDICAID

## 2024-03-06 VITALS
HEART RATE: 78 BPM | BODY MASS INDEX: 40.32 KG/M2 | WEIGHT: 200 LBS | HEIGHT: 59 IN | DIASTOLIC BLOOD PRESSURE: 70 MMHG | SYSTOLIC BLOOD PRESSURE: 112 MMHG

## 2024-03-06 DIAGNOSIS — N89.8 OTHER SPECIFIED NONINFLAMMATORY DISORDERS OF VAGINA: ICD-10-CM

## 2024-03-06 DIAGNOSIS — Z30.016 ENCOUNTER FOR INITIAL PRESCRIPTION OF TRANSDERMAL PATCH HORMONAL CONTRACEPTIVE DEVICE: ICD-10-CM

## 2024-03-06 DIAGNOSIS — Z11.3 ENCOUNTER FOR SCREENING FOR INFECTIONS WITH A PREDOMINANTLY SEXUAL MODE OF TRANSMISSION: ICD-10-CM

## 2024-03-06 PROCEDURE — 99212 OFFICE O/P EST SF 10 MIN: CPT | Mod: 25

## 2024-03-06 PROCEDURE — 99395 PREV VISIT EST AGE 18-39: CPT

## 2024-03-07 NOTE — PHYSICAL EXAM
[Appropriately responsive] : appropriately responsive [Alert] : alert [No Acute Distress] : no acute distress [No Lymphadenopathy] : no lymphadenopathy [Soft] : soft [Non-tender] : non-tender [Non-distended] : non-distended [No HSM] : No HSM [No Mass] : no mass [No Lesions] : no lesions [Oriented x3] : oriented x3 [Examination Of The Breasts] : a normal appearance [No Discharge] : no discharge [No Masses] : no breast masses were palpable [Labia Majora] : normal [Labia Minora] : normal [No Bleeding] : There was no active vaginal bleeding [Normal] : normal [FreeTextEntry4] : minimal vaginal white discharge [Uterine Adnexae] : normal

## 2024-03-07 NOTE — HISTORY OF PRESENT ILLNESS
[Patient reported PAP Smear was normal] : Patient reported PAP Smear was normal [Y] : Positive pregnancy history [TextBox_4] : h/o fibroids no cyst no std 13/28/5 h/o genital warts 2010 [PapSmeardate] : 2022 [PGHxTotal] : 5 [[facility] : 2 [PGHxABInduced] : 3 [FreeTextEntry1] : 2  - 2009 , 3 top ( d&C at 12 wks, 2 medical top)

## 2024-03-07 NOTE — DISCUSSION/SUMMARY
[FreeTextEntry1] : 32 yo  annual gyn, vaginal discharge, std screening pap hpv std screening vag cx Ureaplasma mycoplasma cx std bldwk declined contraception

## 2024-03-08 LAB
C TRACH RRNA SPEC QL NAA+PROBE: NOT DETECTED
HBV SURFACE AB SER QL: NONREACTIVE
HBV SURFACE AG SER QL: NONREACTIVE
HCV RNA SERPL NAA+PROBE-LOG IU: NOT DETECTED LOGIU/ML
HEPC RNA INTERP: NOT DETECTED IU/ML
HIV1+2 AB SPEC QL IA.RAPID: NONREACTIVE
HPV HIGH+LOW RISK DNA PNL CVX: NOT DETECTED
N GONORRHOEA RRNA SPEC QL NAA+PROBE: NOT DETECTED
SOURCE AMPLIFICATION: NORMAL
T PALLIDUM AB SER QL IA: NEGATIVE

## 2024-03-11 DIAGNOSIS — B37.9 CANDIDIASIS, UNSPECIFIED: ICD-10-CM

## 2024-03-11 LAB
A VAGINAE DNA VAG QL NAA+PROBE: NORMAL
BVAB2 DNA VAG QL NAA+PROBE: NORMAL
C KRUSEI DNA VAG QL NAA+PROBE: NEGATIVE
C KRUSEI DNA VAG QL NAA+PROBE: POSITIVE
CANDIDA DNA VAG QL NAA+PROBE: NEGATIVE
CYTOLOGY CVX/VAG DOC THIN PREP: NORMAL
MEGA1 DNA VAG QL NAA+PROBE: NORMAL
T VAGINALIS RRNA SPEC QL NAA+PROBE: NEGATIVE

## 2024-03-12 ENCOUNTER — NON-APPOINTMENT (OUTPATIENT)
Age: 34
End: 2024-03-12

## 2024-03-12 ENCOUNTER — APPOINTMENT (OUTPATIENT)
Dept: OBGYN | Facility: CLINIC | Age: 34
End: 2024-03-12

## 2024-03-14 LAB
MYCOPLASMA HOMINIS CULTURE: NEGATIVE
UREAPLASMA CULTURE: NEGATIVE

## 2024-04-26 ENCOUNTER — APPOINTMENT (OUTPATIENT)
Dept: PLASTIC SURGERY | Facility: CLINIC | Age: 34
End: 2024-04-26
Payer: MEDICAID

## 2024-04-26 VITALS — BODY MASS INDEX: 38.3 KG/M2 | HEIGHT: 59 IN | WEIGHT: 190 LBS

## 2024-04-26 VITALS — WEIGHT: 202 LBS | HEIGHT: 59 IN | BODY MASS INDEX: 40.72 KG/M2

## 2024-04-26 PROCEDURE — 99203 OFFICE O/P NEW LOW 30 MIN: CPT

## 2024-04-26 NOTE — PHYSICAL EXAM
[de-identified] : well-appearing, NAD [de-identified] : EOMI [de-identified] : supple [de-identified] : nonlabored breathing [de-identified] : bilateral shoulder grooving present\par  no trunk deformities [de-identified] : Bilateral superior pole deflation with poor skin tone and significant striae.  narrow breast footprint height BL Left breast: no palpable masses, nipple retraction or discharge. Right breast: no palpable masses, nipple retraction or discharge. Minimal bilateral axillary rolls Bilateral macromastia (right ~ g > left) with Grade II ptosis with chronic inflammatory hyperpigmentation skin changes Bilateral diminished NAC sensibility Anticipated volume of resection of EACH breast based on CLINICAL ASSESSMENT: 500-650 g   Anticipated volume of resection of EACH breast based on BSA: 482 g  Breast measurements (standing, cm): R SN-Nipple: 34 R Nipple-IMF: 14.5 R Nipple - midsternum: 11.5 R NAC diameter: 9.5 IMF position assymmetrical, left 0.5 cm higher than right breast L SN-Nipple: 35 L Nipple-IMF: 14.5 L Nipple - midsternum: 11.5 L NAC diameter: 9 [de-identified] : soft, protuberant, extensive periumbilical skin striae [de-identified] : FROM x 4

## 2024-04-26 NOTE — HISTORY OF PRESENT ILLNESS
[FreeTextEntry1] : Pt is a 31 y/o F with no significant PMH who presents for breast reduction consultation. C/o heavy breasts associated with neck and back pain, bilateral shoulder pain and brassiere grooving, and inframammary rashing. Also c/o difficulty finding clothing/bras that fit properly and difficulty exercising. No PT/chiropractor/XRays. Uses supportive pillows. C/o occasional nipple sensitivity and mild breast pain due to heaviness. Denies nipple inversion/discharge.  Denies personal or family h/o breast disease. Has routine breast exams with gynecologist.  Ht 4'11" Wt 190lb- gained 15-20lbs over the past year Current bra size: 38DDD, unsure what size she would like to be, ?C Denies h/o VTE or MRSA infections Nonsmoker Lives with 12 y/o son; no plans for pregnancy in the near future  occupation: , working from home currently   Interval hx (4/26/2024): Patient returns to clinic to discuss breast reduction. She previously was not ready due to lack of social support but now she has the desired support. She has been losing weight with diet and exercise, approximately 25 pounds. Her goal weight is 180 lbs (200lbs currently). She would like to be a C cup.

## 2024-04-26 NOTE — ASSESSMENT
[FreeTextEntry1] : 31 yo F obese woman with symptomatic bilateral macromastia with Grade III ptosis.  -The patient is a good candidate for bilateral reduction mammoplasty with an inferior pedicle Wise pattern technique but is actively losing weight, anticipating losing 20 more pounds -Referral to bariatric surgery placed, patient may be interested in medication to assist with weight loss -All questions were answered -Return to clinic 2 months after weight stability at goal weight

## 2024-05-15 ENCOUNTER — APPOINTMENT (OUTPATIENT)
Dept: SURGERY | Facility: CLINIC | Age: 34
End: 2024-05-15

## 2024-05-24 ENCOUNTER — RX RENEWAL (OUTPATIENT)
Age: 34
End: 2024-05-24

## 2024-05-24 RX ORDER — FLUCONAZOLE 150 MG/1
150 TABLET ORAL
Qty: 1 | Refills: 0 | Status: ACTIVE | COMMUNITY
Start: 2024-03-11 | End: 1900-01-01

## 2024-07-11 ENCOUNTER — APPOINTMENT (OUTPATIENT)
Dept: SURGERY | Facility: CLINIC | Age: 34
End: 2024-07-11

## 2024-07-12 ENCOUNTER — NON-APPOINTMENT (OUTPATIENT)
Age: 34
End: 2024-07-12

## 2024-07-12 ENCOUNTER — APPOINTMENT (OUTPATIENT)
Dept: SURGERY | Facility: CLINIC | Age: 34
End: 2024-07-12

## 2024-08-01 ENCOUNTER — APPOINTMENT (OUTPATIENT)
Dept: ENDOCRINOLOGY | Facility: CLINIC | Age: 34
End: 2024-08-01

## 2024-08-15 ENCOUNTER — APPOINTMENT (OUTPATIENT)
Dept: OBGYN | Facility: CLINIC | Age: 34
End: 2024-08-15
Payer: MEDICAID

## 2024-08-15 PROCEDURE — 99442: CPT | Mod: 93

## 2024-08-15 RX ORDER — FLUCONAZOLE 150 MG/1
150 TABLET ORAL
Qty: 2 | Refills: 1 | Status: ACTIVE | COMMUNITY
Start: 2024-08-15 | End: 1900-01-01

## 2024-11-18 ENCOUNTER — APPOINTMENT (OUTPATIENT)
Dept: OBGYN | Facility: CLINIC | Age: 34
End: 2024-11-18
Payer: MEDICAID

## 2024-11-18 ENCOUNTER — NON-APPOINTMENT (OUTPATIENT)
Age: 34
End: 2024-11-18

## 2024-11-18 VITALS
HEIGHT: 59 IN | DIASTOLIC BLOOD PRESSURE: 79 MMHG | HEART RATE: 81 BPM | SYSTOLIC BLOOD PRESSURE: 122 MMHG | BODY MASS INDEX: 40.32 KG/M2 | WEIGHT: 200 LBS

## 2024-11-18 DIAGNOSIS — N89.8 OTHER SPECIFIED NONINFLAMMATORY DISORDERS OF VAGINA: ICD-10-CM

## 2024-11-18 DIAGNOSIS — Z11.3 ENCOUNTER FOR SCREENING FOR INFECTIONS WITH A PREDOMINANTLY SEXUAL MODE OF TRANSMISSION: ICD-10-CM

## 2024-11-18 PROCEDURE — 99213 OFFICE O/P EST LOW 20 MIN: CPT

## 2024-11-19 LAB
HBV SURFACE AB SER QL: NONREACTIVE
HBV SURFACE AG SER QL: NONREACTIVE
HCV RNA SERPL NAA+PROBE-LOG IU: NOT DETECTED LOGIU/ML
HEPC RNA INTERP: NOT DETECTED IU/ML
HIV1+2 AB SPEC QL IA.RAPID: NONREACTIVE
T PALLIDUM AB SER QL IA: NEGATIVE

## 2024-12-09 ENCOUNTER — TRANSCRIPTION ENCOUNTER (OUTPATIENT)
Age: 34
End: 2024-12-09

## 2024-12-09 ENCOUNTER — NON-APPOINTMENT (OUTPATIENT)
Age: 34
End: 2024-12-09

## 2024-12-17 ENCOUNTER — APPOINTMENT (OUTPATIENT)
Dept: PSYCHIATRY | Facility: CLINIC | Age: 34
End: 2024-12-17

## 2024-12-17 ENCOUNTER — OUTPATIENT (OUTPATIENT)
Dept: OUTPATIENT SERVICES | Facility: HOSPITAL | Age: 34
LOS: 1 days | End: 2024-12-17
Payer: MEDICAID

## 2024-12-17 DIAGNOSIS — F33.3 MAJOR DEPRESSIVE DISORDER, RECURRENT, SEVERE WITH PSYCHOTIC SYMPTOMS: ICD-10-CM

## 2024-12-17 DIAGNOSIS — F33.1 MAJOR DEPRESSIVE DISORDER, RECURRENT, MODERATE: ICD-10-CM

## 2024-12-17 DIAGNOSIS — Z98.890 OTHER SPECIFIED POSTPROCEDURAL STATES: Chronic | ICD-10-CM

## 2024-12-17 PROCEDURE — 90791 PSYCH DIAGNOSTIC EVALUATION: CPT

## 2024-12-18 DIAGNOSIS — F33.3 MAJOR DEPRESSIVE DISORDER, RECURRENT, SEVERE WITH PSYCHOTIC SYMPTOMS: ICD-10-CM

## 2025-01-03 ENCOUNTER — APPOINTMENT (OUTPATIENT)
Dept: PSYCHIATRY | Facility: CLINIC | Age: 35
End: 2025-01-03
Payer: MEDICAID

## 2025-01-03 ENCOUNTER — OUTPATIENT (OUTPATIENT)
Dept: OUTPATIENT SERVICES | Facility: HOSPITAL | Age: 35
LOS: 1 days | End: 2025-01-03
Payer: MEDICAID

## 2025-01-03 DIAGNOSIS — F32.1 MAJOR DEPRESSIVE DISORDER, SINGLE EPISODE, MODERATE: ICD-10-CM

## 2025-01-03 DIAGNOSIS — F41.9 ANXIETY DISORDER, UNSPECIFIED: ICD-10-CM

## 2025-01-03 DIAGNOSIS — Z98.890 OTHER SPECIFIED POSTPROCEDURAL STATES: Chronic | ICD-10-CM

## 2025-01-03 DIAGNOSIS — F41.1 GENERALIZED ANXIETY DISORDER: ICD-10-CM

## 2025-01-03 PROCEDURE — 98005 SYNCH AUDIO-VIDEO EST LOW 20: CPT

## 2025-01-03 PROCEDURE — 99205 OFFICE O/P NEW HI 60 MIN: CPT | Mod: 95

## 2025-01-03 RX ORDER — FLUOXETINE HYDROCHLORIDE 10 MG/1
10 CAPSULE ORAL
Qty: 30 | Refills: 0 | Status: ACTIVE | COMMUNITY
Start: 2025-01-03 | End: 1900-01-01

## 2025-01-04 DIAGNOSIS — F41.1 GENERALIZED ANXIETY DISORDER: ICD-10-CM

## 2025-01-09 ENCOUNTER — NON-APPOINTMENT (OUTPATIENT)
Age: 35
End: 2025-01-09

## 2025-01-15 ENCOUNTER — APPOINTMENT (OUTPATIENT)
Dept: PSYCHIATRY | Facility: CLINIC | Age: 35
End: 2025-01-15
Payer: MEDICAID

## 2025-01-15 ENCOUNTER — OUTPATIENT (OUTPATIENT)
Dept: OUTPATIENT SERVICES | Facility: HOSPITAL | Age: 35
LOS: 1 days | End: 2025-01-15
Payer: MEDICAID

## 2025-01-15 DIAGNOSIS — F41.9 ANXIETY DISORDER, UNSPECIFIED: ICD-10-CM

## 2025-01-15 DIAGNOSIS — Z98.890 OTHER SPECIFIED POSTPROCEDURAL STATES: Chronic | ICD-10-CM

## 2025-01-15 DIAGNOSIS — Z3A.26 26 WEEKS GESTATION OF PREGNANCY: ICD-10-CM

## 2025-01-15 DIAGNOSIS — Z3A.17 17 WEEKS GESTATION OF PREGNANCY: ICD-10-CM

## 2025-01-15 DIAGNOSIS — Z3A.36 36 WEEKS GESTATION OF PREGNANCY: ICD-10-CM

## 2025-01-15 DIAGNOSIS — Z3A.11 11 WEEKS GESTATION OF PREGNANCY: ICD-10-CM

## 2025-01-15 DIAGNOSIS — F32.1 MAJOR DEPRESSIVE DISORDER, SINGLE EPISODE, MODERATE: ICD-10-CM

## 2025-01-15 DIAGNOSIS — O99.012 ANEMIA COMPLICATING PREGNANCY, SECOND TRIMESTER: ICD-10-CM

## 2025-01-15 PROCEDURE — 99214 OFFICE O/P EST MOD 30 MIN: CPT | Mod: 95

## 2025-01-15 PROCEDURE — 98007 SYNCH AUDIO-VIDEO EST HI 40: CPT

## 2025-01-17 ENCOUNTER — OUTPATIENT (OUTPATIENT)
Dept: OUTPATIENT SERVICES | Facility: HOSPITAL | Age: 35
LOS: 1 days | End: 2025-01-17
Payer: MEDICAID

## 2025-01-17 ENCOUNTER — APPOINTMENT (OUTPATIENT)
Dept: PSYCHIATRY | Facility: CLINIC | Age: 35
End: 2025-01-17

## 2025-01-17 DIAGNOSIS — F33.3 MAJOR DEPRESSIVE DISORDER, RECURRENT, SEVERE WITH PSYCHOTIC SYMPTOMS: ICD-10-CM

## 2025-01-17 DIAGNOSIS — Z98.890 OTHER SPECIFIED POSTPROCEDURAL STATES: Chronic | ICD-10-CM

## 2025-01-17 PROCEDURE — 90832 PSYTX W PT 30 MINUTES: CPT

## 2025-01-18 DIAGNOSIS — F33.3 MAJOR DEPRESSIVE DISORDER, RECURRENT, SEVERE WITH PSYCHOTIC SYMPTOMS: ICD-10-CM

## 2025-01-18 DIAGNOSIS — F33.1 MAJOR DEPRESSIVE DISORDER, RECURRENT, MODERATE: ICD-10-CM

## 2025-01-24 NOTE — OB PROVIDER TRIAGE NOTE - GRAVIDA, OB PROFILE
4 [email or web address] Jhonatan Smith ar  Nephrology  [address] [place] [address]  Phone: [phone]  Fax: [phone]  Established Patient  Follow Up Time: 1-3 days    Sridhar Treadwell  Cardiovascular Disease  [address] [place] [address]  Phone: [phone]  Fax: [phone]  Established Patient  Follow Up Time: 1-3 days

## 2025-01-31 ENCOUNTER — APPOINTMENT (OUTPATIENT)
Dept: PSYCHIATRY | Facility: CLINIC | Age: 35
End: 2025-01-31

## 2025-02-05 ENCOUNTER — APPOINTMENT (OUTPATIENT)
Dept: PSYCHIATRY | Facility: CLINIC | Age: 35
End: 2025-02-05
Payer: MEDICAID

## 2025-02-05 ENCOUNTER — OUTPATIENT (OUTPATIENT)
Dept: OUTPATIENT SERVICES | Facility: HOSPITAL | Age: 35
LOS: 1 days | End: 2025-02-05
Payer: MEDICAID

## 2025-02-05 DIAGNOSIS — F41.9 ANXIETY DISORDER, UNSPECIFIED: ICD-10-CM

## 2025-02-05 DIAGNOSIS — F32.1 MAJOR DEPRESSIVE DISORDER, SINGLE EPISODE, MODERATE: ICD-10-CM

## 2025-02-05 DIAGNOSIS — Z98.890 OTHER SPECIFIED POSTPROCEDURAL STATES: Chronic | ICD-10-CM

## 2025-02-05 PROCEDURE — 99214 OFFICE O/P EST MOD 30 MIN: CPT | Mod: 95

## 2025-02-05 PROCEDURE — 98007 SYNCH AUDIO-VIDEO EST HI 40: CPT

## 2025-02-07 ENCOUNTER — APPOINTMENT (OUTPATIENT)
Dept: PSYCHIATRY | Facility: CLINIC | Age: 35
End: 2025-02-07

## 2025-02-07 ENCOUNTER — OUTPATIENT (OUTPATIENT)
Dept: OUTPATIENT SERVICES | Facility: HOSPITAL | Age: 35
LOS: 1 days | End: 2025-02-07
Payer: MEDICAID

## 2025-02-07 DIAGNOSIS — F33.3 MAJOR DEPRESSIVE DISORDER, RECURRENT, SEVERE WITH PSYCHOTIC SYMPTOMS: ICD-10-CM

## 2025-02-07 DIAGNOSIS — Z98.890 OTHER SPECIFIED POSTPROCEDURAL STATES: Chronic | ICD-10-CM

## 2025-02-07 PROCEDURE — 90834 PSYTX W PT 45 MINUTES: CPT

## 2025-02-08 DIAGNOSIS — F33.3 MAJOR DEPRESSIVE DISORDER, RECURRENT, SEVERE WITH PSYCHOTIC SYMPTOMS: ICD-10-CM

## 2025-02-14 ENCOUNTER — OUTPATIENT (OUTPATIENT)
Dept: OUTPATIENT SERVICES | Facility: HOSPITAL | Age: 35
LOS: 1 days | End: 2025-02-14
Payer: MEDICAID

## 2025-02-14 ENCOUNTER — APPOINTMENT (OUTPATIENT)
Dept: PSYCHIATRY | Facility: CLINIC | Age: 35
End: 2025-02-14

## 2025-02-14 DIAGNOSIS — Z98.890 OTHER SPECIFIED POSTPROCEDURAL STATES: Chronic | ICD-10-CM

## 2025-02-14 DIAGNOSIS — F33.3 MAJOR DEPRESSIVE DISORDER, RECURRENT, SEVERE WITH PSYCHOTIC SYMPTOMS: ICD-10-CM

## 2025-02-14 PROCEDURE — 90834 PSYTX W PT 45 MINUTES: CPT

## 2025-02-15 DIAGNOSIS — F33.3 MAJOR DEPRESSIVE DISORDER, RECURRENT, SEVERE WITH PSYCHOTIC SYMPTOMS: ICD-10-CM

## 2025-02-26 ENCOUNTER — APPOINTMENT (OUTPATIENT)
Dept: PSYCHIATRY | Facility: CLINIC | Age: 35
End: 2025-02-26
Payer: MEDICAID

## 2025-02-26 ENCOUNTER — OUTPATIENT (OUTPATIENT)
Dept: OUTPATIENT SERVICES | Facility: HOSPITAL | Age: 35
LOS: 1 days | End: 2025-02-26
Payer: MEDICAID

## 2025-02-26 DIAGNOSIS — F32.1 MAJOR DEPRESSIVE DISORDER, SINGLE EPISODE, MODERATE: ICD-10-CM

## 2025-02-26 DIAGNOSIS — Z98.890 OTHER SPECIFIED POSTPROCEDURAL STATES: Chronic | ICD-10-CM

## 2025-02-26 DIAGNOSIS — F41.9 ANXIETY DISORDER, UNSPECIFIED: ICD-10-CM

## 2025-02-26 PROCEDURE — 99214 OFFICE O/P EST MOD 30 MIN: CPT | Mod: 95

## 2025-02-26 PROCEDURE — 98007 SYNCH AUDIO-VIDEO EST HI 40: CPT

## 2025-02-26 RX ORDER — CHROMIUM 200 MCG
TABLET ORAL
Refills: 0 | Status: ACTIVE | COMMUNITY

## 2025-02-26 RX ORDER — PNV NO.95/FERROUS FUM/FOLIC AC 28MG-0.8MG
325 TABLET ORAL
Refills: 0 | Status: ACTIVE | COMMUNITY

## 2025-02-26 RX ORDER — BUPROPION HYDROCHLORIDE 150 MG/1
150 TABLET, EXTENDED RELEASE ORAL
Qty: 30 | Refills: 1 | Status: ACTIVE | COMMUNITY
Start: 2025-02-26 | End: 1900-01-01

## 2025-02-27 ENCOUNTER — APPOINTMENT (OUTPATIENT)
Dept: PSYCHIATRY | Facility: CLINIC | Age: 35
End: 2025-02-27

## 2025-02-27 DIAGNOSIS — F41.9 ANXIETY DISORDER, UNSPECIFIED: ICD-10-CM

## 2025-03-19 ENCOUNTER — APPOINTMENT (OUTPATIENT)
Dept: PSYCHIATRY | Facility: CLINIC | Age: 35
End: 2025-03-19

## 2025-03-20 ENCOUNTER — APPOINTMENT (OUTPATIENT)
Dept: PSYCHIATRY | Facility: CLINIC | Age: 35
End: 2025-03-20
Payer: COMMERCIAL

## 2025-03-20 ENCOUNTER — OUTPATIENT (OUTPATIENT)
Dept: OUTPATIENT SERVICES | Facility: HOSPITAL | Age: 35
LOS: 1 days | End: 2025-03-20
Payer: COMMERCIAL

## 2025-03-20 DIAGNOSIS — F41.9 ANXIETY DISORDER, UNSPECIFIED: ICD-10-CM

## 2025-03-20 DIAGNOSIS — Z98.890 OTHER SPECIFIED POSTPROCEDURAL STATES: Chronic | ICD-10-CM

## 2025-03-20 DIAGNOSIS — F32.1 MAJOR DEPRESSIVE DISORDER, SINGLE EPISODE, MODERATE: ICD-10-CM

## 2025-03-20 PROCEDURE — 99214 OFFICE O/P EST MOD 30 MIN: CPT | Mod: 95

## 2025-03-20 PROCEDURE — 98007 SYNCH AUDIO-VIDEO EST HI 40: CPT

## 2025-03-20 RX ORDER — ESCITALOPRAM OXALATE 10 MG/1
10 TABLET ORAL DAILY
Qty: 30 | Refills: 0 | Status: ACTIVE | COMMUNITY
Start: 2025-03-20 | End: 1900-01-01

## 2025-03-21 DIAGNOSIS — F32.1 MAJOR DEPRESSIVE DISORDER, SINGLE EPISODE, MODERATE: ICD-10-CM

## 2025-04-02 ENCOUNTER — RX RENEWAL (OUTPATIENT)
Age: 35
End: 2025-04-02

## 2025-04-11 ENCOUNTER — OUTPATIENT (OUTPATIENT)
Dept: OUTPATIENT SERVICES | Facility: HOSPITAL | Age: 35
LOS: 1 days | End: 2025-04-11
Payer: COMMERCIAL

## 2025-04-11 ENCOUNTER — APPOINTMENT (OUTPATIENT)
Dept: PSYCHIATRY | Facility: CLINIC | Age: 35
End: 2025-04-11
Payer: COMMERCIAL

## 2025-04-11 DIAGNOSIS — F41.9 ANXIETY DISORDER, UNSPECIFIED: ICD-10-CM

## 2025-04-11 DIAGNOSIS — F32.1 MAJOR DEPRESSIVE DISORDER, SINGLE EPISODE, MODERATE: ICD-10-CM

## 2025-04-11 DIAGNOSIS — Z98.890 OTHER SPECIFIED POSTPROCEDURAL STATES: Chronic | ICD-10-CM

## 2025-04-11 PROCEDURE — 99214 OFFICE O/P EST MOD 30 MIN: CPT | Mod: 95

## 2025-04-12 DIAGNOSIS — F41.9 ANXIETY DISORDER, UNSPECIFIED: ICD-10-CM

## 2025-04-12 DIAGNOSIS — F32.1 MAJOR DEPRESSIVE DISORDER, SINGLE EPISODE, MODERATE: ICD-10-CM

## 2025-05-05 ENCOUNTER — EMERGENCY (EMERGENCY)
Facility: HOSPITAL | Age: 35
LOS: 0 days | Discharge: HOME | End: 2025-05-05
Attending: STUDENT IN AN ORGANIZED HEALTH CARE EDUCATION/TRAINING PROGRAM
Payer: COMMERCIAL

## 2025-05-05 VITALS
TEMPERATURE: 98 F | HEART RATE: 81 BPM | OXYGEN SATURATION: 99 % | DIASTOLIC BLOOD PRESSURE: 83 MMHG | SYSTOLIC BLOOD PRESSURE: 136 MMHG | RESPIRATION RATE: 18 BRPM

## 2025-05-05 VITALS
RESPIRATION RATE: 19 BRPM | HEIGHT: 59 IN | WEIGHT: 199.96 LBS | TEMPERATURE: 98 F | OXYGEN SATURATION: 99 % | DIASTOLIC BLOOD PRESSURE: 70 MMHG | HEART RATE: 116 BPM | SYSTOLIC BLOOD PRESSURE: 117 MMHG

## 2025-05-05 DIAGNOSIS — F32.A DEPRESSION, UNSPECIFIED: ICD-10-CM

## 2025-05-05 DIAGNOSIS — M79.89 OTHER SPECIFIED SOFT TISSUE DISORDERS: ICD-10-CM

## 2025-05-05 DIAGNOSIS — Z98.890 OTHER SPECIFIED POSTPROCEDURAL STATES: Chronic | ICD-10-CM

## 2025-05-05 PROCEDURE — 10061 I&D ABSCESS COMP/MULTIPLE: CPT

## 2025-05-05 PROCEDURE — 10060 I&D ABSCESS SIMPLE/SINGLE: CPT

## 2025-05-05 PROCEDURE — 99283 EMERGENCY DEPT VISIT LOW MDM: CPT | Mod: 25

## 2025-05-05 PROCEDURE — 99284 EMERGENCY DEPT VISIT MOD MDM: CPT | Mod: 25

## 2025-05-05 RX ORDER — ACETAMINOPHEN 500 MG/5ML
650 LIQUID (ML) ORAL ONCE
Refills: 0 | Status: COMPLETED | OUTPATIENT
Start: 2025-05-05 | End: 2025-05-05

## 2025-05-05 RX ORDER — IBUPROFEN 200 MG
800 TABLET ORAL ONCE
Refills: 0 | Status: COMPLETED | OUTPATIENT
Start: 2025-05-05 | End: 2025-05-05

## 2025-05-05 RX ORDER — BUTYROSPERMUM PARKII(SHEA BUTTER), SIMMONDSIA CHINENSIS (JOJOBA) SEED OIL, ALOE BARBADENSIS LEAF EXTRACT .01; 1; 3.5 G/100G; G/100G; G/100G
2 LIQUID TOPICAL
Qty: 28 | Refills: 0
Start: 2025-05-05 | End: 2025-05-11

## 2025-05-05 RX ORDER — CEPHALEXIN 250 MG/1
500 CAPSULE ORAL ONCE
Refills: 0 | Status: COMPLETED | OUTPATIENT
Start: 2025-05-05 | End: 2025-05-05

## 2025-05-05 RX ORDER — CEPHALEXIN 250 MG/1
1 CAPSULE ORAL
Qty: 28 | Refills: 0
Start: 2025-05-05 | End: 2025-05-11

## 2025-05-05 RX ADMIN — CEPHALEXIN 500 MILLIGRAM(S): 250 CAPSULE ORAL at 03:54

## 2025-05-05 RX ADMIN — Medication 650 MILLIGRAM(S): at 03:27

## 2025-05-05 RX ADMIN — Medication 800 MILLIGRAM(S): at 03:27

## 2025-05-05 NOTE — ED PROVIDER NOTE - OBJECTIVE STATEMENT
34-year-old female with a past medical history of depression presents to the ED for evaluation of left armpit pain and swelling that began this past Monday.  Patient reports she was seen by her primary care doctor on Friday and was started on Bactrim which she has been taking.  Patient reports swelling to the left armpit has increased in size.  Patient reports she does shave her armpits.  Patient reports has been applying warm compresses.  Patient denies fever, chills, drainage from the left armpit, weakness, chest pain, shortness of breath.

## 2025-05-05 NOTE — ED PROVIDER NOTE - PATIENT PORTAL LINK FT
You can access the FollowMyHealth Patient Portal offered by [facility] by registering at the following website: [email or web address]. By joining [employer]’s FollowMyHealth portal, you will also be able to view your health information using other applications (apps) compatible with our system.

## 2025-05-05 NOTE — ED PROVIDER NOTE - NSFOLLOWUPCLINICS_GEN_ALL_ED_FT
[facility]  Surgery  [address] [place] [address]  Phone: [phone]  Fax:   Follow Up Time: 7-10 Days

## 2025-05-05 NOTE — ED PROVIDER NOTE - PHYSICAL EXAMINATION
Physical Exam    Vital Signs: I have reviewed the initial vital signs.  Constitutional: well-nourished, appears stated age, no acute distress  Eyes: Conjunctiva pink, Sclera clear  Cardiovascular: well-perfused extremities  Respiratory: unlabored respiratory effort,  pt is speaking full sentences. no accessory muscle use.   Musculoskeletal: FROM of b/l upper and lower extremities  Integumentary: warm, dry, no rash. (+) tender, swollen ~5 x6 induration without erythema, drainage or fluctuance to the left axilla.   Neurologic: awake, alert, steady gait.   Psychiatric: appropriate mood, appropriate affect

## 2025-05-05 NOTE — ED PROVIDER NOTE - NSFOLLOWUPINSTRUCTIONS_ED_ALL_ED_FT
Abscess    An abscess is an infected area that contains a collection of pus and debris. It can occur in almost any part of the body and occurs when the tissue gets infection. Symptoms include a painful mass that is red, warm, tender that might break open and HAVE drainage. If your health care provider gave you antibiotics make sure to take the full course and do not stop even if feeling better.     SEEK IMMEDIATE MEDICAL CARE IF YOU HAVE ANY OF THE FOLLOWING SYMPTOMS: chills, fever, muscle aches, or red streaking from the area.    PLEASE RETURN IN 2-3 DAYS FOR WOUND CHECK.

## 2025-05-05 NOTE — ED PROVIDER NOTE - ATTENDING APP SHARED VISIT CONTRIBUTION OF CARE
34 yr old f w/  a pmh significant for depression who presents with abscess. Pt states that for the past couple of days she has noticed an abscess to her L axila. Pt went to PCP whom prescribed bactrim. Pt denies any fevers, chills, nausea, vomiting, or any other medical complaints.     VITAL SIGNS: I have reviewed nursing notes and confirm.  CONSTITUTIONAL: non-toxic, well appearing  SKIN: no rash, no petechiae. 5 x6 abscess noted to the axilla, no crepitus, or discharge.   EXT: Normal ROM x4.     34 yr old f that presents with an abscess to the L axila. I&D preformed in the ED. Will inform pt to continue with bactrim and also will add keflex. pt also instructed to return to the ED in 48 hours for wound check.

## 2025-05-05 NOTE — ED PROVIDER NOTE - PROGRESS NOTE DETAILS
FF; beside ultrasound shows loculated abscess to the left axilla. pt left axilla was anesthetized, drained and packed. Patient advised of strict return precautions discussed at bedside.  Patient advised to return in 2 to 3 days for wound check.  pt advised to follow-up with general surgery.

## 2025-05-05 NOTE — ED PROVIDER NOTE - CLINICAL SUMMARY MEDICAL DECISION MAKING FREE TEXT BOX
34 yr old f that presents with an abscess to the L axila. I&D preformed in the ED. Will inform pt to continue with bactrim and also will add keflex. pt also instructed to return to the ED in 48 hours for wound check.  Appropriate medications for patient's presenting complaints were ordered and effects were reassessed.  Patient's records (prior hospital, ED visit, and/or nursing home notes if available) were reviewed.  Additional history was obtained from EMS, family, and/or PCP (where available).  Escalation to admission/observation was considered.   However patient feels much better and is comfortable with discharge.  Appropriate follow-up was arranged.     I have discussed the discharge plan with the patient. The patient agrees with the plan, as discussed.  The patient understands Emergency Department diagnosis is a preliminary diagnosis often based on limited information and that the patient must adhere to the follow-up plan as discussed.  The patient understands that if the symptoms worsen or if prescribed medications do not have the desired/planned effect that the patient may return to the Emergency Department at any time for further evaluation and treatment.

## 2025-05-05 NOTE — ED ADULT TRIAGE NOTE - CHIEF COMPLAINT QUOTE
I have a lump under my underarm that is very painful on the left side for a week - patient  Patient denies fever, denies diabetic history

## 2025-05-16 ENCOUNTER — NON-APPOINTMENT (OUTPATIENT)
Age: 35
End: 2025-05-16

## 2025-05-16 ENCOUNTER — OUTPATIENT (OUTPATIENT)
Dept: OUTPATIENT SERVICES | Facility: HOSPITAL | Age: 35
LOS: 1 days | End: 2025-05-16
Payer: COMMERCIAL

## 2025-05-16 ENCOUNTER — APPOINTMENT (OUTPATIENT)
Dept: PSYCHIATRY | Facility: CLINIC | Age: 35
End: 2025-05-16
Payer: COMMERCIAL

## 2025-05-16 DIAGNOSIS — F32.1 MAJOR DEPRESSIVE DISORDER, SINGLE EPISODE, MODERATE: ICD-10-CM

## 2025-05-16 DIAGNOSIS — Z98.890 OTHER SPECIFIED POSTPROCEDURAL STATES: Chronic | ICD-10-CM

## 2025-05-16 DIAGNOSIS — F41.9 ANXIETY DISORDER, UNSPECIFIED: ICD-10-CM

## 2025-05-16 PROCEDURE — 99214 OFFICE O/P EST MOD 30 MIN: CPT | Mod: 95

## 2025-05-16 PROCEDURE — 98007 SYNCH AUDIO-VIDEO EST HI 40: CPT

## 2025-05-18 ENCOUNTER — EMERGENCY (EMERGENCY)
Facility: HOSPITAL | Age: 35
LOS: 0 days | Discharge: HOME | End: 2025-05-18
Attending: STUDENT IN AN ORGANIZED HEALTH CARE EDUCATION/TRAINING PROGRAM
Payer: COMMERCIAL

## 2025-05-18 VITALS
RESPIRATION RATE: 18 BRPM | HEIGHT: 59 IN | HEART RATE: 90 BPM | TEMPERATURE: 98 F | OXYGEN SATURATION: 98 % | WEIGHT: 199.96 LBS | SYSTOLIC BLOOD PRESSURE: 110 MMHG | DIASTOLIC BLOOD PRESSURE: 65 MMHG

## 2025-05-18 DIAGNOSIS — Z98.890 OTHER SPECIFIED POSTPROCEDURAL STATES: Chronic | ICD-10-CM

## 2025-05-18 DIAGNOSIS — L02.412 CUTANEOUS ABSCESS OF LEFT AXILLA: ICD-10-CM

## 2025-05-18 PROCEDURE — 76882 US LMTD JT/FCL EVL NVASC XTR: CPT | Mod: 26,LT

## 2025-05-18 PROCEDURE — 76882 US LMTD JT/FCL EVL NVASC XTR: CPT | Mod: LT

## 2025-05-18 PROCEDURE — 99284 EMERGENCY DEPT VISIT MOD MDM: CPT | Mod: 25

## 2025-05-18 RX ORDER — CLINDAMYCIN PHOSPHATE 150 MG/ML
1 VIAL (ML) INJECTION
Qty: 40 | Refills: 0
Start: 2025-05-18 | End: 2025-05-27

## 2025-05-18 RX ORDER — CLINDAMYCIN PHOSPHATE 150 MG/ML
1 VIAL (ML) INJECTION
Refills: 0
Start: 2025-05-18

## 2025-05-18 RX ORDER — IBUPROFEN 200 MG
600 TABLET ORAL ONCE
Refills: 0 | Status: DISCONTINUED | OUTPATIENT
Start: 2025-05-18 | End: 2025-05-18

## 2025-05-18 NOTE — ED PROVIDER NOTE - PATIENT PORTAL LINK FT
You can access the FollowMyHealth Patient Portal offered by [facility] by registering at the following website: [email or web address]. By joining [employer]’s FollowMyHealth portal, you will also be able to view your health information using other applications (apps) compatible with our system.

## 2025-05-18 NOTE — ED PROVIDER NOTE - OBJECTIVE STATEMENT
34-year-old female with no past medical history presents to ED with complaints of lump to her left armpit.  Patient states that she noticed this about a week and a half ago, came to this ED and had it drained, started on oral antibiotics.  Patient states that she took about a week of the antibiotics, but did not finish the whole thing.  Patient reports over the last 2 days she has noticed that the bump is getting bigger.  No drainage.  No fevers.  Reports some pain to the site.

## 2025-05-18 NOTE — ED PROVIDER NOTE - PHYSICAL EXAMINATION
VITAL SIGNS: I have reviewed nursing notes and confirm.  CONSTITUTIONAL: Well-developed; well-nourished; in no acute distress.  EXT: Small area of fluctuance noted to left axilla.  No active drainage.  No overlying redness.  Tender.  NEURO: Alert, oriented.

## 2025-05-18 NOTE — ED PROVIDER NOTE - IV ALTEPLASE DOOR HIDDEN
show What Type Of Note Output Would You Prefer (Optional)?: Standard Output How Severe Are Your Spot(S)?: moderate Have Your Spot(S) Been Treated In The Past?: has not been treated Hpi Title: Evaluation of Skin Lesions

## 2025-05-18 NOTE — ED ADULT TRIAGE NOTE - INTERNATIONAL TRAVEL
No Implemented All Universal Safety Interventions:  [place] to call system. Call bell, personal items and telephone within reach. Instruct patient to call for assistance. Room bathroom lighting operational. Non-slip footwear when patient is off stretcher. Physically safe environment: no spills, clutter or unnecessary equipment. Stretcher in lowest position, wheels locked, appropriate side rails in place.

## 2025-05-18 NOTE — ED PROVIDER NOTE - CLINICAL SUMMARY MEDICAL DECISION MAKING FREE TEXT BOX
.    34-year-old female, PMH as noted, presents with recurrent left axillary abscess.  No fever weakness or numbness.  Exam as noted above.  Bedside ultrasound shows fluid collection consistent with abscess.  Abscess drained without complication.  Leg bag in place.  Patient stable for discharge with 3d ED follow-up for wound check/ packing removal, supportive care, antibiotics return precautions.  DC home      .

## 2025-05-18 NOTE — ED PROVIDER NOTE - NSFOLLOWUPINSTRUCTIONS_ED_ALL_ED_FT
PLEASE RETURN TO ED IN 3 DAYS TO REMOVE/EXCHANGE PACKING.    Abscess    WHAT YOU NEED TO KNOW:    What is an abscess? An abscess is an area under the skin where pus (infected fluid) collects. An abscess is often caused by bacteria, fungi or other germs that get into an open wound. You can get an abscess anywhere on your body.  Skin Abscess    What increases my risk for an abscess?    An animal bite    A foreign object lodged under your skin    Heavy or frequent sweating    A health problem, such as diabetes or obesity    Injecting illegal drugs  What are the signs and symptoms of an abscess? You may have a swollen mass that is red and painful. Pus may leak out of the mass. The pus will be white or yellow and may smell bad. You may have redness and pain days before the mass appears. You may have a fever and chills if the infection spreads.    How is an abscess diagnosed? Your healthcare provider will examine the area. He or she will check to see if your abscess is draining. A sample of fluid from your abscess may show what is causing your infection.    How is an abscess treated?    Incision and drainage is a procedure used to remove pus and fluid from the abscess. Your healthcare provider will make a cut in the abscess so it can drain. Then gauze will be put into the wound and it will be covered with a bandage.    Surgery may be needed to remove your abscess. Your healthcare provider may do this if the abscess is on your hands or buttocks. Surgery can decrease the risk that the abscess will come back.  What can I do to care for myself?    Apply a warm compress to your abscess. This will help it open and drain. Wet a washcloth in warm, but not hot, water. Apply the compress for 10 minutes. Repeat this 4 times each day. Do not press on an abscess or try to open it with a needle. You may push the bacteria deeper or into your blood.    Do not share your clothes, towels, or sheets with anyone. This can spread the infection to others.    Wash your hands often. This can help prevent the spread of germs. Use soap and water or an alcohol-based hand rub.  Handwashing  What can I do to care for my wound after it is drained?    Care for your wound as directed. If your healthcare provider says it is okay, carefully remove the bandage and gauze packing. You may need to soak the gauze to get it out of your wound. Clean your wound and the area around it as directed. Dry the area and put on new, clean bandages. Change your bandages when they get wet or dirty.    Ask your healthcare provider how to change the gauze in your wound. Keep track of how many pieces of gauze are placed inside the wound. Do not put too much packing in the wound. Do not pack the gauze too tightly in your wound.  When should I seek immediate care?    The area around your abscess becomes very painful, warm, or has red streaks.    You have a fever and chills.    Your heart is beating faster than usual.    You feel faint or confused.  When should I call my doctor?    Your abscess gets bigger.    Your abscess returns.    You have questions or concerns about your condition or care.  CARE AGREEMENT:    You have the right to help plan your care. Learn about your health condition and how it may be treated. Discuss treatment options with your healthcare providers to decide what care you want to receive. You always have the right to refuse treatment.

## 2025-06-12 ENCOUNTER — APPOINTMENT (OUTPATIENT)
Dept: PSYCHIATRY | Facility: CLINIC | Age: 35
End: 2025-06-12

## 2025-06-19 ENCOUNTER — EMERGENCY (EMERGENCY)
Facility: HOSPITAL | Age: 35
LOS: 0 days | Discharge: HOME | End: 2025-06-19
Attending: EMERGENCY MEDICINE
Payer: COMMERCIAL

## 2025-06-19 VITALS
TEMPERATURE: 98 F | OXYGEN SATURATION: 100 % | HEIGHT: 59 IN | HEART RATE: 101 BPM | SYSTOLIC BLOOD PRESSURE: 92 MMHG | RESPIRATION RATE: 16 BRPM | DIASTOLIC BLOOD PRESSURE: 63 MMHG | WEIGHT: 199.96 LBS

## 2025-06-19 DIAGNOSIS — R21 RASH AND OTHER NONSPECIFIC SKIN ERUPTION: ICD-10-CM

## 2025-06-19 DIAGNOSIS — L50.9 URTICARIA, UNSPECIFIED: ICD-10-CM
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
## 2025-06-19 DIAGNOSIS — Z98.890 OTHER SPECIFIED POSTPROCEDURAL STATES: Chronic | ICD-10-CM

## 2025-06-19 DIAGNOSIS — T78.40XA ALLERGY, UNSPECIFIED, INITIAL ENCOUNTER: ICD-10-CM
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
## 2025-06-19 PROCEDURE — 99283 EMERGENCY DEPT VISIT LOW MDM: CPT

## 2025-06-19 PROCEDURE — 99284 EMERGENCY DEPT VISIT MOD MDM: CPT

## 2025-06-19 RX ORDER — PREDNISONE 20 MG/1
2 TABLET ORAL
Qty: 8 | Refills: 0
Start: 2025-06-19 | End: 2025-06-22

## 2025-06-19 RX ORDER — PREDNISONE 20 MG/1
40 TABLET ORAL ONCE
Refills: 0 | Status: COMPLETED | OUTPATIENT
Start: 2025-06-19 | End: 2025-06-19

## 2025-06-19 RX ADMIN — Medication 10 MILLIGRAM(S): at 10:17

## 2025-06-19 RX ADMIN — Medication 20 MILLIGRAM(S): at 10:17

## 2025-06-19 RX ADMIN — PREDNISONE 40 MILLIGRAM(S): 20 TABLET ORAL at 10:17

## 2025-06-19 NOTE — ED PROVIDER NOTE - PATIENT PORTAL LINK FT
You can access the FollowMyHealth Patient Portal offered by [facility] by registering at the following website: [email or web address]. By joining [employer]’s FollowMyHealth portal, you will also be able to view your health information using other applications (apps) compatible with our system.

## 2025-06-19 NOTE — ED PROVIDER NOTE - OBJECTIVE STATEMENT
34-year-old female with no past medical history presents to ED with complaints of a rash.  Patient states that she had reported body hives starting 5 days ago.  Itchy.  Taking Benadryl at home, which helps, but states that every time she goes back into her bed the rash returns.  No fevers.  No difficulty breathing.  No nausea vomiting or abdominal pain.  No throat closing sensation.  No new soaps, detergents, bedding, food, or recent contacts.  No recent travels.  Patient did finish a course of antibiotics, Bactrim, for an abscess, but has taken Bactrim in the past,  started 5 days ago, has not taken it since the rash started.  Last took Benadryl last night.  No other concerns.

## 2025-06-19 NOTE — ED ADULT NURSE NOTE - CAS EDN DISCHARGE ASSESSMENT
"THORACIC SURGERY - NEW PATIENT OFFICE VISIT      Dear Dr. Fitzpatrick,    I saw Kellee Turk in consultation for the evaluation and treatment of pectus excavatum.    HPI  Ms. Kellee Turk is a 20 year old female patient who presented with exercise intolerance and occasional chest pain. She likes swimming and horse back riding, and she has noticed that she can't keep up with her peers. She also has dyspnea and palpitations with physical activity. She was referred by her PCP for surgical consultation.                        Previsit Tests   Covid vaccination status: Vaccinated  CT scan (6/7/22): severe pectus excavatum deformity with Amado index of 7.1, right sternal torsion, and mild compression of the right atrium         PMH  None    PSH  None     Allergies   Allergen Reactions     Amoxicillin Shortness Of Breath and Rash       Current Outpatient Medications   Medication     Multiple Vitamin (MULTIVITAMIN OR)     No current facility-administered medications for this visit.       ETOH: None  TOBACCO: Occasional     Physical examination  /67 (BP Location: Right arm, Patient Position: Sitting, Cuff Size: Child)   Pulse 85   Temp 98.4  F (36.9  C) (Oral)   Resp 18   Ht 1.675 m (5' 5.95\")   Wt 40.1 kg (88 lb 4.8 oz)   LMP 06/22/2022 (Approximate)   SpO2 97%   BMI 14.28 kg/m    Alert and oriented NAD.   Bilateral breath sounds.   Chest: Downward deformity of the sternum, no tilt. Moderate costal flare. No chest scars.      From a personal perspective, she comes to clinic with her mom. Her name comes from [facility] culture. She is in school and is going to graduate soon from [employer]. She is also planning a trip to [place] in January.     IMPRESSION   20 year old female patient with severe pectus excavatum.    PLAN  I spent 30 min on the date of the encounter in chart review, patient visit, review of tests, documentation and/or discussion with other providers about the issues documented above. I " reviewed the plan as follows:  Procedure planned: Modified Ravitch repair with sternal plating. She would like to schedule surgery in late January.   Necessary Preop Tests & Appointments: Return to clinic in early January. H&P by PCP. Insurance approval.   Regional Anesthesia Plan: Intraoperative intercostal cryoablation.  Anticoagulation Plan: Enoxaparin postop.   I had an extensive discussion with the patient regarding the rationale for surgery, the alternatives risks and benefits of the procedure. I explained the expected hospital stay, postoperative course, recovery time, diet and activity restrictions. Informed consent was obtained and they agreed to proceed.  I appreciate the opportunity to participate in the care of your patient and will keep you updated.  Sincerely,  Bay Obrien MD           Alert and oriented to person, place and time

## 2025-06-19 NOTE — ED PROVIDER NOTE - NSFOLLOWUPINSTRUCTIONS_ED_ALL_ED_FT
Our Emergency Department Referral Coordinators will be reaching out to you in the next 24-48 hours from 9:00am to 5:00pm with a follow up appointment. Please expect a phone call from the hospital in that time frame. If you do not receive a call or if you have any questions or concerns, you can reach them at   [phone]    Urticaria    WHAT YOU NEED TO KNOW:    Urticaria is also called hives. Hives can change size and shape, and appear anywhere on your skin. They can be mild or severe and last from a few minutes to a few days. Hives may be a sign of a severe allergic reaction called anaphylaxis that needs immediate treatment. Urticaria that lasts longer than 6 weeks may be a chronic condition that needs long-term treatment.  Hives    DISCHARGE INSTRUCTIONS:    Call your local emergency number (911 in the [place]) for signs or symptoms of anaphylaxis, such as trouble breathing, swelling in your mouth or throat, or wheezing. You may also have itching, a rash, or feel like you are going to faint.    Return to the emergency department if:    Your heart is beating faster than it normally does.    You have cramping or severe pain in your abdomen.  Call your doctor if:    You have a fever.    Your skin still itches 24 hours after you take your medicine.    You still have hives after 7 days.    Your joints are painful and swollen.    You have questions or concerns about your condition or care.  Medicines: You may need any of the following:    Epinephrine is used to treat severe allergic reactions such as anaphylaxis.    Antihistamines decrease mild symptoms such as itching or a rash.    Steroids decrease redness, pain, and swelling.    Take your medicine as directed. Contact your healthcare provider if you think your medicine is not helping or if you have side effects. Tell your provider if you are allergic to any medicine. Keep a list of the medicines, vitamins, and herbs you take. Include the amounts, and when and why you take them. Bring the list or the pill bottles to follow-up visits. Carry your medicine list with you in case of an emergency.  Steps to take for signs or symptoms of anaphylaxis:    Immediately give 1 shot of epinephrine only into the outer thigh muscle.  How to Give An Epinephrine Shot Adult      Leave the shot in place as directed. Your provider may recommend you leave it in place for up to 10 seconds before you remove it. This helps make sure all of the epinephrine is delivered.    Call 911 and go to the emergency department, even if the shot improved symptoms. Do not drive yourself. Bring the used epinephrine shot with you.  Safety precautions to take if you are at risk for anaphylaxis:    Keep 2 shots of epinephrine with you at all times. You may need a second shot, because epinephrine only works for about 20 minutes and symptoms may return. Your provider can show you and family members how to give the shot. Check the expiration date every month and replace it before it expires.    Create an action plan. Your provider can help you create a written plan that explains the allergy and an emergency plan to treat a reaction. The plan explains when to give a second epinephrine shot if symptoms return or do not improve after the first. Give copies of the action plan and emergency instructions to family members, work and school staff, and  providers. Show them how to give a shot of epinephrine.    Be careful when you exercise. If you have had exercise-induced anaphylaxis, do not exercise right after you eat. Stop exercising right away if you start to develop any signs or symptoms of anaphylaxis. You may first feel tired, warm, or have itchy skin. Hives, swelling, and severe breathing problems may develop if you continue to exercise.    Carry medical alert identification. Wear medical alert jewelry or carry a card that explains the allergy. Ask your provider where to get these items.  Medical Alert Jewelry      Keep a record of triggers and symptoms. Record everything you eat, drink, or apply to your skin for 3 weeks. Include stressful events and what you were doing right before your hives started. Bring the record with you to follow-up visits with your provider.  Manage urticaria:    Cool your skin. This may help decrease itching. Apply a cool pack to your hives. Dip a hand towel in cool water, wring it out, and place it on your hives. You may also soak your skin in a cool oatmeal bath.    Do not rub your hives. This can irritate your skin and cause more hives.    Wear loose clothing. Tight clothes may irritate your skin and cause more hives.    Manage stress. Stress may trigger hives, or make them worse. Learn new ways to relax, such as deep breathing.  Follow up with your healthcare provider as directed: Write down your questions so you remember to ask them during your visits.

## 2025-06-19 NOTE — ED PROVIDER NOTE - PHYSICAL EXAMINATION
VITAL SIGNS: I have reviewed nursing notes and confirm.  CONSTITUTIONAL: Well-developed; well-nourished; in no acute distress.  SKIN: Urticarial rash noted to bilateral upper extremities and torso.  Small area to face.  Spares palms, soles, and mucous membranes.  Faint to the bilateral lower extremities.  HEAD: Normocephalic; atraumatic.  EYES: PERRL, EOM intact  ENT: airway clear.   NECK: Supple, No tongue swelling.  CARD: S1, S2 normal; no murmurs, gallops, or rubs. Regular rate and rhythm.  RESP: No wheezes, rales or rhonchi.  ABD: Normal bowel sounds; soft; non-distended; non-tender  EXT: Normal ROM.   NEURO: Alert, oriented.   PSYCH: Cooperative, appropriate.

## 2025-06-19 NOTE — ED ADULT TRIAGE NOTE - CHIEF COMPLAINT QUOTE
"I'm itchy and have blotches all over my body", reports this has been going on for 5 days. no known allergies. pt states she took Benadryl yesterday

## 2025-06-19 NOTE — ED PROVIDER NOTE - CLINICAL SUMMARY MEDICAL DECISION MAKING FREE TEXT BOX
Agree with above history and exam.  Patient here with urticarial rash no evidence of airway involvement no signs anaphylaxis plan for steroids and outpatient allergy follow-up.

## 2025-06-23 ENCOUNTER — NON-APPOINTMENT (OUTPATIENT)
Age: 35
End: 2025-06-23

## 2025-06-25 ENCOUNTER — APPOINTMENT (OUTPATIENT)
Dept: SURGERY | Facility: CLINIC | Age: 35
End: 2025-06-25
Payer: COMMERCIAL

## 2025-06-25 VITALS
BODY MASS INDEX: 43.34 KG/M2 | WEIGHT: 215 LBS | DIASTOLIC BLOOD PRESSURE: 74 MMHG | OXYGEN SATURATION: 99 % | HEART RATE: 82 BPM | HEIGHT: 59 IN | SYSTOLIC BLOOD PRESSURE: 118 MMHG

## 2025-06-25 PROCEDURE — 99203 OFFICE O/P NEW LOW 30 MIN: CPT

## 2025-06-27 PROBLEM — L02.419 ABSCESS, AXILLA: Status: ACTIVE | Noted: 2025-06-27

## 2025-06-27 RX ORDER — SEMAGLUTIDE 0.25 MG/.5ML
0.25 INJECTION, SOLUTION SUBCUTANEOUS
Qty: 1 | Refills: 2 | Status: ACTIVE | COMMUNITY
Start: 2025-06-27 | End: 1900-01-01

## 2025-06-27 RX ORDER — SEMAGLUTIDE 0.68 MG/ML
2 INJECTION, SOLUTION SUBCUTANEOUS
Qty: 2 | Refills: 0 | Status: DISCONTINUED | COMMUNITY
Start: 2025-06-27 | End: 2025-06-27

## 2025-06-27 NOTE — CHART NOTE - NSCHARTNOTEFT_GEN_A_CORE
left vm 6/20 - DK / left vm 6/23 - DK (Allergy Referral)
left vm 6/20 - DK / left vm 6/23 - DK. emailed to tim sykes 6/24 / as per Luna, sent to Dr. Man's office to find sooner appt 6/24 - DK / No update 6/27 - JL / as per Luna, pt refused appt from Dr. Man's office, declined services 6/27 - DK (Allergy Referral)

## 2025-06-30 ENCOUNTER — NON-APPOINTMENT (OUTPATIENT)
Age: 35
End: 2025-06-30

## 2025-07-11 ENCOUNTER — APPOINTMENT (OUTPATIENT)
Dept: SURGERY | Facility: CLINIC | Age: 35
End: 2025-07-11

## 2025-07-24 ENCOUNTER — NON-APPOINTMENT (OUTPATIENT)
Age: 35
End: 2025-07-24

## 2025-07-25 ENCOUNTER — APPOINTMENT (OUTPATIENT)
Dept: SURGERY | Facility: CLINIC | Age: 35
End: 2025-07-25
Payer: COMMERCIAL

## 2025-07-25 VITALS
BODY MASS INDEX: 41.43 KG/M2 | SYSTOLIC BLOOD PRESSURE: 106 MMHG | DIASTOLIC BLOOD PRESSURE: 74 MMHG | WEIGHT: 211 LBS | HEIGHT: 60 IN | TEMPERATURE: 97 F

## 2025-07-25 PROCEDURE — 99214 OFFICE O/P EST MOD 30 MIN: CPT

## 2025-08-07 ENCOUNTER — APPOINTMENT (OUTPATIENT)
Dept: SURGERY | Facility: CLINIC | Age: 35
End: 2025-08-07
Payer: COMMERCIAL

## 2025-08-07 VITALS
TEMPERATURE: 97 F | OXYGEN SATURATION: 98 % | HEART RATE: 92 BPM | SYSTOLIC BLOOD PRESSURE: 112 MMHG | WEIGHT: 212 LBS | DIASTOLIC BLOOD PRESSURE: 78 MMHG | HEIGHT: 60 IN | BODY MASS INDEX: 41.62 KG/M2

## 2025-08-07 PROCEDURE — 99213 OFFICE O/P EST LOW 20 MIN: CPT

## 2025-08-07 RX ORDER — SEMAGLUTIDE 0.5 MG/.5ML
0.5 INJECTION, SOLUTION SUBCUTANEOUS
Qty: 1 | Refills: 0 | Status: ACTIVE | COMMUNITY
Start: 2025-08-07 | End: 1900-01-01

## 2025-08-14 ENCOUNTER — NON-APPOINTMENT (OUTPATIENT)
Age: 35
End: 2025-08-14

## 2025-08-14 DIAGNOSIS — F41.9 ANXIETY DISORDER, UNSPECIFIED: ICD-10-CM

## 2025-08-29 ENCOUNTER — APPOINTMENT (OUTPATIENT)
Dept: SURGERY | Facility: CLINIC | Age: 35
End: 2025-08-29